# Patient Record
Sex: FEMALE | Race: WHITE | NOT HISPANIC OR LATINO | Employment: UNEMPLOYED | URBAN - METROPOLITAN AREA
[De-identification: names, ages, dates, MRNs, and addresses within clinical notes are randomized per-mention and may not be internally consistent; named-entity substitution may affect disease eponyms.]

---

## 2021-01-07 ENCOUNTER — HOSPITAL ENCOUNTER (OUTPATIENT)
Dept: RADIOLOGY | Facility: MEDICAL CENTER | Age: 56
End: 2021-01-07
Attending: INTERNAL MEDICINE
Payer: COMMERCIAL

## 2021-01-07 DIAGNOSIS — C72.9 MALIGNANT NEOPLASM OF NERVOUS SYSTEM (HCC): ICD-10-CM

## 2021-01-07 PROCEDURE — A9576 INJ PROHANCE MULTIPACK: HCPCS | Performed by: INTERNAL MEDICINE

## 2021-01-07 PROCEDURE — 70553 MRI BRAIN STEM W/O & W/DYE: CPT

## 2021-01-07 PROCEDURE — 700117 HCHG RX CONTRAST REV CODE 255: Performed by: INTERNAL MEDICINE

## 2021-01-07 RX ADMIN — GADOTERIDOL 19 ML: 279.3 INJECTION, SOLUTION INTRAVENOUS at 08:52

## 2022-04-16 ENCOUNTER — ANESTHESIA (OUTPATIENT)
Dept: SURGERY | Facility: MEDICAL CENTER | Age: 57
End: 2022-04-16
Payer: COMMERCIAL

## 2022-04-16 ENCOUNTER — ANESTHESIA EVENT (OUTPATIENT)
Dept: SURGERY | Facility: MEDICAL CENTER | Age: 57
End: 2022-04-16
Payer: COMMERCIAL

## 2022-04-16 ENCOUNTER — HOSPITAL ENCOUNTER (OUTPATIENT)
Facility: MEDICAL CENTER | Age: 57
End: 2022-04-17
Attending: EMERGENCY MEDICINE | Admitting: SURGERY
Payer: COMMERCIAL

## 2022-04-16 ENCOUNTER — APPOINTMENT (OUTPATIENT)
Dept: RADIOLOGY | Facility: MEDICAL CENTER | Age: 57
End: 2022-04-16
Attending: EMERGENCY MEDICINE
Payer: COMMERCIAL

## 2022-04-16 DIAGNOSIS — K82.9 GALLBLADDER DISEASE: ICD-10-CM

## 2022-04-16 PROBLEM — K80.00 ACUTE CALCULOUS CHOLECYSTITIS: Status: ACTIVE | Noted: 2022-04-16

## 2022-04-16 PROBLEM — M31.30 WEGENER'S GRANULOMATOSIS WITHOUT RENAL INVOLVEMENT (HCC): Status: ACTIVE | Noted: 2022-04-16

## 2022-04-16 LAB
ABO + RH BLD: NORMAL
ABO GROUP BLD: NORMAL
ALBUMIN SERPL BCP-MCNC: 4.3 G/DL (ref 3.2–4.9)
ALBUMIN/GLOB SERPL: 1.5 G/DL
ALP SERPL-CCNC: 112 U/L (ref 30–99)
ALT SERPL-CCNC: 24 U/L (ref 2–50)
ANION GAP SERPL CALC-SCNC: 13 MMOL/L (ref 7–16)
APPEARANCE UR: CLEAR
AST SERPL-CCNC: 16 U/L (ref 12–45)
BASOPHILS # BLD AUTO: 0.3 % (ref 0–1.8)
BASOPHILS # BLD: 0.05 K/UL (ref 0–0.12)
BILIRUB SERPL-MCNC: 0.4 MG/DL (ref 0.1–1.5)
BILIRUB UR QL STRIP.AUTO: NEGATIVE
BLD GP AB SCN SERPL QL: NORMAL
BUN SERPL-MCNC: 17 MG/DL (ref 8–22)
CALCIUM SERPL-MCNC: 9.6 MG/DL (ref 8.5–10.5)
CFT BLD TEG: 5.2 MIN (ref 4.6–9.1)
CFT P HPASE BLD TEG: 5.2 MIN (ref 4.3–8.3)
CHLORIDE SERPL-SCNC: 105 MMOL/L (ref 96–112)
CLOT ANGLE BLD TEG: 74.9 DEGREES (ref 63–78)
CO2 SERPL-SCNC: 23 MMOL/L (ref 20–33)
COLOR UR: YELLOW
CREAT SERPL-MCNC: 0.57 MG/DL (ref 0.5–1.4)
CT.EXTRINSIC BLD ROTEM: 1.1 MIN (ref 0.8–2.1)
EKG IMPRESSION: NORMAL
EOSINOPHIL # BLD AUTO: 0.26 K/UL (ref 0–0.51)
EOSINOPHIL NFR BLD: 1.8 % (ref 0–6.9)
ERYTHROCYTE [DISTWIDTH] IN BLOOD BY AUTOMATED COUNT: 49.1 FL (ref 35.9–50)
GFR SERPLBLD CREATININE-BSD FMLA CKD-EPI: 105 ML/MIN/1.73 M 2
GLOBULIN SER CALC-MCNC: 2.9 G/DL (ref 1.9–3.5)
GLUCOSE SERPL-MCNC: 111 MG/DL (ref 65–99)
GLUCOSE UR STRIP.AUTO-MCNC: NEGATIVE MG/DL
HCT VFR BLD AUTO: 46.9 % (ref 37–47)
HGB BLD-MCNC: 15.7 G/DL (ref 12–16)
IMM GRANULOCYTES # BLD AUTO: 0.07 K/UL (ref 0–0.11)
IMM GRANULOCYTES NFR BLD AUTO: 0.5 % (ref 0–0.9)
KETONES UR STRIP.AUTO-MCNC: NEGATIVE MG/DL
LEUKOCYTE ESTERASE UR QL STRIP.AUTO: NEGATIVE
LIPASE SERPL-CCNC: 21 U/L (ref 11–82)
LYMPHOCYTES # BLD AUTO: 3.02 K/UL (ref 1–4.8)
LYMPHOCYTES NFR BLD: 20.9 % (ref 22–41)
MCF BLD TEG: 64.1 MM (ref 52–69)
MCF.PLATELET INHIB BLD ROTEM: 26.6 MM (ref 15–32)
MCH RBC QN AUTO: 32 PG (ref 27–33)
MCHC RBC AUTO-ENTMCNC: 33.5 G/DL (ref 33.6–35)
MCV RBC AUTO: 95.5 FL (ref 81.4–97.8)
MICRO URNS: NORMAL
MONOCYTES # BLD AUTO: 1.02 K/UL (ref 0–0.85)
MONOCYTES NFR BLD AUTO: 7.1 % (ref 0–13.4)
NEUTROPHILS # BLD AUTO: 10.04 K/UL (ref 2–7.15)
NEUTROPHILS NFR BLD: 69.4 % (ref 44–72)
NITRITE UR QL STRIP.AUTO: NEGATIVE
NRBC # BLD AUTO: 0 K/UL
NRBC BLD-RTO: 0 /100 WBC
PA AA BLD-ACNC: 42.7 % (ref 0–11)
PA ADP BLD-ACNC: 13.3 % (ref 0–17)
PH UR STRIP.AUTO: 5.5 [PH] (ref 5–8)
PLATELET # BLD AUTO: 294 K/UL (ref 164–446)
PMV BLD AUTO: 9.2 FL (ref 9–12.9)
POTASSIUM SERPL-SCNC: 4.5 MMOL/L (ref 3.6–5.5)
PROT SERPL-MCNC: 7.2 G/DL (ref 6–8.2)
PROT UR QL STRIP: NEGATIVE MG/DL
RBC # BLD AUTO: 4.91 M/UL (ref 4.2–5.4)
RBC UR QL AUTO: NEGATIVE
RH BLD: NORMAL
SARS-COV+SARS-COV-2 AG RESP QL IA.RAPID: NOTDETECTED
SODIUM SERPL-SCNC: 141 MMOL/L (ref 135–145)
SP GR UR STRIP.AUTO: 1.02
SPECIMEN SOURCE: NORMAL
TEG ALGORITHM TGALG: ABNORMAL
UROBILINOGEN UR STRIP.AUTO-MCNC: 0.2 MG/DL
WBC # BLD AUTO: 14.5 K/UL (ref 4.8–10.8)

## 2022-04-16 PROCEDURE — 00790 ANES IPER UPR ABD NOS: CPT | Performed by: ANESTHESIOLOGY

## 2022-04-16 PROCEDURE — 86900 BLOOD TYPING SEROLOGIC ABO: CPT

## 2022-04-16 PROCEDURE — 160048 HCHG OR STATISTICAL LEVEL 1-5: Performed by: SURGERY

## 2022-04-16 PROCEDURE — 85025 COMPLETE CBC W/AUTO DIFF WBC: CPT

## 2022-04-16 PROCEDURE — 700111 HCHG RX REV CODE 636 W/ 250 OVERRIDE (IP): Performed by: EMERGENCY MEDICINE

## 2022-04-16 PROCEDURE — 700102 HCHG RX REV CODE 250 W/ 637 OVERRIDE(OP): Performed by: SURGERY

## 2022-04-16 PROCEDURE — 500002 HCHG ADHESIVE, DERMABOND: Performed by: SURGERY

## 2022-04-16 PROCEDURE — 80053 COMPREHEN METABOLIC PANEL: CPT

## 2022-04-16 PROCEDURE — 85576 BLOOD PLATELET AGGREGATION: CPT | Mod: 91

## 2022-04-16 PROCEDURE — 96375 TX/PRO/DX INJ NEW DRUG ADDON: CPT

## 2022-04-16 PROCEDURE — 47562 LAPAROSCOPIC CHOLECYSTECTOMY: CPT | Performed by: SURGERY

## 2022-04-16 PROCEDURE — 160036 HCHG PACU - EA ADDL 30 MINS PHASE I: Performed by: SURGERY

## 2022-04-16 PROCEDURE — 700101 HCHG RX REV CODE 250: Performed by: SURGERY

## 2022-04-16 PROCEDURE — 81003 URINALYSIS AUTO W/O SCOPE: CPT

## 2022-04-16 PROCEDURE — 36415 COLL VENOUS BLD VENIPUNCTURE: CPT

## 2022-04-16 PROCEDURE — 501838 HCHG SUTURE GENERAL: Performed by: SURGERY

## 2022-04-16 PROCEDURE — 502571 HCHG PACK, LAP CHOLE: Performed by: SURGERY

## 2022-04-16 PROCEDURE — 86850 RBC ANTIBODY SCREEN: CPT

## 2022-04-16 PROCEDURE — 700105 HCHG RX REV CODE 258: Performed by: ANESTHESIOLOGY

## 2022-04-16 PROCEDURE — G0378 HOSPITAL OBSERVATION PER HR: HCPCS

## 2022-04-16 PROCEDURE — 96374 THER/PROPH/DIAG INJ IV PUSH: CPT

## 2022-04-16 PROCEDURE — 160041 HCHG SURGERY MINUTES - EA ADDL 1 MIN LEVEL 4: Performed by: SURGERY

## 2022-04-16 PROCEDURE — 99222 1ST HOSP IP/OBS MODERATE 55: CPT | Mod: 57 | Performed by: SURGERY

## 2022-04-16 PROCEDURE — 76705 ECHO EXAM OF ABDOMEN: CPT

## 2022-04-16 PROCEDURE — 83690 ASSAY OF LIPASE: CPT

## 2022-04-16 PROCEDURE — 500868 HCHG NEEDLE, SURGI(VARES): Performed by: SURGERY

## 2022-04-16 PROCEDURE — 160002 HCHG RECOVERY MINUTES (STAT): Performed by: SURGERY

## 2022-04-16 PROCEDURE — A9270 NON-COVERED ITEM OR SERVICE: HCPCS | Performed by: SURGERY

## 2022-04-16 PROCEDURE — 160029 HCHG SURGERY MINUTES - 1ST 30 MINS LEVEL 4: Performed by: SURGERY

## 2022-04-16 PROCEDURE — 700111 HCHG RX REV CODE 636 W/ 250 OVERRIDE (IP): Performed by: ANESTHESIOLOGY

## 2022-04-16 PROCEDURE — 86901 BLOOD TYPING SEROLOGIC RH(D): CPT

## 2022-04-16 PROCEDURE — A9270 NON-COVERED ITEM OR SERVICE: HCPCS | Performed by: ANESTHESIOLOGY

## 2022-04-16 PROCEDURE — 88304 TISSUE EXAM BY PATHOLOGIST: CPT

## 2022-04-16 PROCEDURE — 501399 HCHG SPECIMAN BAG, ENDO CATC: Performed by: SURGERY

## 2022-04-16 PROCEDURE — 99285 EMERGENCY DEPT VISIT HI MDM: CPT

## 2022-04-16 PROCEDURE — 700101 HCHG RX REV CODE 250: Performed by: ANESTHESIOLOGY

## 2022-04-16 PROCEDURE — 501570 HCHG TROCAR, SEPARATOR: Performed by: SURGERY

## 2022-04-16 PROCEDURE — 700105 HCHG RX REV CODE 258: Performed by: SURGERY

## 2022-04-16 PROCEDURE — 99140 ANES COMP EMERGENCY COND: CPT | Performed by: ANESTHESIOLOGY

## 2022-04-16 PROCEDURE — 160009 HCHG ANES TIME/MIN: Performed by: SURGERY

## 2022-04-16 PROCEDURE — 700102 HCHG RX REV CODE 250 W/ 637 OVERRIDE(OP): Performed by: ANESTHESIOLOGY

## 2022-04-16 PROCEDURE — 71045 X-RAY EXAM CHEST 1 VIEW: CPT

## 2022-04-16 PROCEDURE — 93005 ELECTROCARDIOGRAM TRACING: CPT | Performed by: EMERGENCY MEDICINE

## 2022-04-16 PROCEDURE — 85347 COAGULATION TIME ACTIVATED: CPT

## 2022-04-16 PROCEDURE — 87426 SARSCOV CORONAVIRUS AG IA: CPT

## 2022-04-16 PROCEDURE — 501571 HCHG TROCAR, SEPARATOR 12X100: Performed by: SURGERY

## 2022-04-16 PROCEDURE — 47562 LAPAROSCOPIC CHOLECYSTECTOMY: CPT | Mod: AS | Performed by: NURSE PRACTITIONER

## 2022-04-16 PROCEDURE — 160035 HCHG PACU - 1ST 60 MINS PHASE I: Performed by: SURGERY

## 2022-04-16 PROCEDURE — 85384 FIBRINOGEN ACTIVITY: CPT

## 2022-04-16 RX ORDER — ZOLPIDEM TARTRATE 10 MG/1
10 TABLET ORAL
COMMUNITY

## 2022-04-16 RX ORDER — ROCURONIUM BROMIDE 10 MG/ML
INJECTION, SOLUTION INTRAVENOUS PRN
Status: DISCONTINUED | OUTPATIENT
Start: 2022-04-16 | End: 2022-04-16 | Stop reason: SURG

## 2022-04-16 RX ORDER — HYDROMORPHONE HYDROCHLORIDE 1 MG/ML
0.6 INJECTION, SOLUTION INTRAMUSCULAR; INTRAVENOUS; SUBCUTANEOUS
Status: DISCONTINUED | OUTPATIENT
Start: 2022-04-16 | End: 2022-04-16 | Stop reason: HOSPADM

## 2022-04-16 RX ORDER — BISACODYL 10 MG
10 SUPPOSITORY, RECTAL RECTAL
Status: DISCONTINUED | OUTPATIENT
Start: 2022-04-16 | End: 2022-04-17 | Stop reason: HOSPADM

## 2022-04-16 RX ORDER — ONDANSETRON 2 MG/ML
4 INJECTION INTRAMUSCULAR; INTRAVENOUS
Status: COMPLETED | OUTPATIENT
Start: 2022-04-16 | End: 2022-04-16

## 2022-04-16 RX ORDER — OXYCODONE HYDROCHLORIDE 10 MG/1
10 TABLET ORAL
Status: DISCONTINUED | OUTPATIENT
Start: 2022-04-16 | End: 2022-04-17 | Stop reason: HOSPADM

## 2022-04-16 RX ORDER — ONDANSETRON 2 MG/ML
4 INJECTION INTRAMUSCULAR; INTRAVENOUS EVERY 4 HOURS PRN
Status: DISCONTINUED | OUTPATIENT
Start: 2022-04-16 | End: 2022-04-17 | Stop reason: HOSPADM

## 2022-04-16 RX ORDER — OXYCODONE HYDROCHLORIDE 5 MG/1
5 TABLET ORAL
Status: DISCONTINUED | OUTPATIENT
Start: 2022-04-16 | End: 2022-04-17 | Stop reason: HOSPADM

## 2022-04-16 RX ORDER — HYDROMORPHONE HYDROCHLORIDE 1 MG/ML
0.5 INJECTION, SOLUTION INTRAMUSCULAR; INTRAVENOUS; SUBCUTANEOUS
Status: DISCONTINUED | OUTPATIENT
Start: 2022-04-16 | End: 2022-04-17 | Stop reason: HOSPADM

## 2022-04-16 RX ORDER — DOCUSATE SODIUM 100 MG/1
100 CAPSULE, LIQUID FILLED ORAL 2 TIMES DAILY
Status: DISCONTINUED | OUTPATIENT
Start: 2022-04-16 | End: 2022-04-17 | Stop reason: HOSPADM

## 2022-04-16 RX ORDER — AZITHROMYCIN 250 MG/1
250-500 TABLET, FILM COATED ORAL DAILY
Status: SHIPPED | COMMUNITY
End: 2023-05-22

## 2022-04-16 RX ORDER — SIMVASTATIN 20 MG
20 TABLET ORAL NIGHTLY
Status: DISCONTINUED | OUTPATIENT
Start: 2022-04-16 | End: 2022-04-17 | Stop reason: HOSPADM

## 2022-04-16 RX ORDER — KETOROLAC TROMETHAMINE 30 MG/ML
INJECTION, SOLUTION INTRAMUSCULAR; INTRAVENOUS PRN
Status: DISCONTINUED | OUTPATIENT
Start: 2022-04-16 | End: 2022-04-16 | Stop reason: SURG

## 2022-04-16 RX ORDER — AMOXICILLIN 250 MG
1 CAPSULE ORAL NIGHTLY
Status: DISCONTINUED | OUTPATIENT
Start: 2022-04-16 | End: 2022-04-17 | Stop reason: HOSPADM

## 2022-04-16 RX ORDER — HYDRALAZINE HYDROCHLORIDE 20 MG/ML
5 INJECTION INTRAMUSCULAR; INTRAVENOUS
Status: DISCONTINUED | OUTPATIENT
Start: 2022-04-16 | End: 2022-04-16 | Stop reason: HOSPADM

## 2022-04-16 RX ORDER — DIPHENHYDRAMINE HYDROCHLORIDE 50 MG/ML
12.5 INJECTION INTRAMUSCULAR; INTRAVENOUS
Status: DISCONTINUED | OUTPATIENT
Start: 2022-04-16 | End: 2022-04-16 | Stop reason: HOSPADM

## 2022-04-16 RX ORDER — BUPIVACAINE HYDROCHLORIDE AND EPINEPHRINE 5; 5 MG/ML; UG/ML
INJECTION, SOLUTION EPIDURAL; INTRACAUDAL; PERINEURAL
Status: DISCONTINUED | OUTPATIENT
Start: 2022-04-16 | End: 2022-04-16 | Stop reason: HOSPADM

## 2022-04-16 RX ORDER — ENEMA 19; 7 G/133ML; G/133ML
1 ENEMA RECTAL
Status: DISCONTINUED | OUTPATIENT
Start: 2022-04-16 | End: 2022-04-17 | Stop reason: HOSPADM

## 2022-04-16 RX ORDER — OXYCODONE HCL 5 MG/5 ML
10 SOLUTION, ORAL ORAL
Status: COMPLETED | OUTPATIENT
Start: 2022-04-16 | End: 2022-04-16

## 2022-04-16 RX ORDER — CEFOTETAN DISODIUM 2 G/20ML
INJECTION, POWDER, FOR SOLUTION INTRAMUSCULAR; INTRAVENOUS PRN
Status: DISCONTINUED | OUTPATIENT
Start: 2022-04-16 | End: 2022-04-16 | Stop reason: SURG

## 2022-04-16 RX ORDER — SODIUM CHLORIDE, SODIUM LACTATE, POTASSIUM CHLORIDE, CALCIUM CHLORIDE 600; 310; 30; 20 MG/100ML; MG/100ML; MG/100ML; MG/100ML
INJECTION, SOLUTION INTRAVENOUS CONTINUOUS
Status: DISCONTINUED | OUTPATIENT
Start: 2022-04-16 | End: 2022-04-16 | Stop reason: HOSPADM

## 2022-04-16 RX ORDER — SIMVASTATIN 20 MG
20 TABLET ORAL NIGHTLY
Status: SHIPPED | COMMUNITY
End: 2023-12-26

## 2022-04-16 RX ORDER — HYDROMORPHONE HYDROCHLORIDE 1 MG/ML
0.4 INJECTION, SOLUTION INTRAMUSCULAR; INTRAVENOUS; SUBCUTANEOUS
Status: DISCONTINUED | OUTPATIENT
Start: 2022-04-16 | End: 2022-04-16 | Stop reason: HOSPADM

## 2022-04-16 RX ORDER — HYDROMORPHONE HYDROCHLORIDE 1 MG/ML
0.2 INJECTION, SOLUTION INTRAMUSCULAR; INTRAVENOUS; SUBCUTANEOUS
Status: DISCONTINUED | OUTPATIENT
Start: 2022-04-16 | End: 2022-04-16 | Stop reason: HOSPADM

## 2022-04-16 RX ORDER — MEPERIDINE HYDROCHLORIDE 25 MG/ML
12.5 INJECTION INTRAMUSCULAR; INTRAVENOUS; SUBCUTANEOUS
Status: DISCONTINUED | OUTPATIENT
Start: 2022-04-16 | End: 2022-04-16 | Stop reason: HOSPADM

## 2022-04-16 RX ORDER — SODIUM CHLORIDE, SODIUM LACTATE, POTASSIUM CHLORIDE, CALCIUM CHLORIDE 600; 310; 30; 20 MG/100ML; MG/100ML; MG/100ML; MG/100ML
INJECTION, SOLUTION INTRAVENOUS
Status: DISCONTINUED | OUTPATIENT
Start: 2022-04-16 | End: 2022-04-16 | Stop reason: SURG

## 2022-04-16 RX ORDER — ACETAMINOPHEN 325 MG/1
650 TABLET ORAL EVERY 4 HOURS PRN
Status: DISCONTINUED | OUTPATIENT
Start: 2022-04-16 | End: 2022-04-17 | Stop reason: HOSPADM

## 2022-04-16 RX ORDER — PROMETHAZINE HYDROCHLORIDE 25 MG/1
25 TABLET ORAL
COMMUNITY

## 2022-04-16 RX ORDER — DEXAMETHASONE SODIUM PHOSPHATE 4 MG/ML
INJECTION, SOLUTION INTRA-ARTICULAR; INTRALESIONAL; INTRAMUSCULAR; INTRAVENOUS; SOFT TISSUE PRN
Status: DISCONTINUED | OUTPATIENT
Start: 2022-04-16 | End: 2022-04-16 | Stop reason: SURG

## 2022-04-16 RX ORDER — FOLIC ACID 1 MG/1
1 TABLET ORAL DAILY
Status: DISCONTINUED | OUTPATIENT
Start: 2022-04-16 | End: 2022-04-17 | Stop reason: HOSPADM

## 2022-04-16 RX ORDER — MIDAZOLAM HYDROCHLORIDE 1 MG/ML
INJECTION INTRAMUSCULAR; INTRAVENOUS PRN
Status: DISCONTINUED | OUTPATIENT
Start: 2022-04-16 | End: 2022-04-16 | Stop reason: SURG

## 2022-04-16 RX ORDER — FOLIC ACID 1 MG/1
1 TABLET ORAL DAILY
Status: SHIPPED | COMMUNITY
End: 2023-09-25 | Stop reason: SDUPTHER

## 2022-04-16 RX ORDER — SODIUM CHLORIDE, SODIUM LACTATE, POTASSIUM CHLORIDE, CALCIUM CHLORIDE 600; 310; 30; 20 MG/100ML; MG/100ML; MG/100ML; MG/100ML
INJECTION, SOLUTION INTRAVENOUS CONTINUOUS
Status: DISCONTINUED | OUTPATIENT
Start: 2022-04-16 | End: 2022-04-17 | Stop reason: HOSPADM

## 2022-04-16 RX ORDER — KETOROLAC TROMETHAMINE 30 MG/ML
15 INJECTION, SOLUTION INTRAMUSCULAR; INTRAVENOUS ONCE
Status: COMPLETED | OUTPATIENT
Start: 2022-04-16 | End: 2022-04-16

## 2022-04-16 RX ORDER — ONDANSETRON 2 MG/ML
INJECTION INTRAMUSCULAR; INTRAVENOUS PRN
Status: DISCONTINUED | OUTPATIENT
Start: 2022-04-16 | End: 2022-04-16 | Stop reason: SURG

## 2022-04-16 RX ORDER — POLYETHYLENE GLYCOL 3350 17 G/17G
1 POWDER, FOR SOLUTION ORAL 2 TIMES DAILY
Status: DISCONTINUED | OUTPATIENT
Start: 2022-04-16 | End: 2022-04-17 | Stop reason: HOSPADM

## 2022-04-16 RX ORDER — LIDOCAINE HYDROCHLORIDE 40 MG/ML
SOLUTION TOPICAL PRN
Status: DISCONTINUED | OUTPATIENT
Start: 2022-04-16 | End: 2022-04-16 | Stop reason: SURG

## 2022-04-16 RX ORDER — OXYCODONE HCL 5 MG/5 ML
5 SOLUTION, ORAL ORAL
Status: COMPLETED | OUTPATIENT
Start: 2022-04-16 | End: 2022-04-16

## 2022-04-16 RX ORDER — ONDANSETRON 4 MG/1
4 TABLET, ORALLY DISINTEGRATING ORAL EVERY 4 HOURS PRN
Status: DISCONTINUED | OUTPATIENT
Start: 2022-04-16 | End: 2022-04-17 | Stop reason: HOSPADM

## 2022-04-16 RX ORDER — CEFTRIAXONE 2 G/1
2 INJECTION, POWDER, FOR SOLUTION INTRAMUSCULAR; INTRAVENOUS ONCE
Status: COMPLETED | OUTPATIENT
Start: 2022-04-16 | End: 2022-04-16

## 2022-04-16 RX ORDER — AMOXICILLIN 250 MG
1 CAPSULE ORAL
Status: DISCONTINUED | OUTPATIENT
Start: 2022-04-16 | End: 2022-04-17 | Stop reason: HOSPADM

## 2022-04-16 RX ORDER — IBUPROFEN 200 MG
800 TABLET ORAL
Status: SHIPPED | COMMUNITY
End: 2023-12-26

## 2022-04-16 RX ORDER — HALOPERIDOL 5 MG/ML
1 INJECTION INTRAMUSCULAR
Status: DISCONTINUED | OUTPATIENT
Start: 2022-04-16 | End: 2022-04-16 | Stop reason: HOSPADM

## 2022-04-16 RX ADMIN — OXYCODONE HYDROCHLORIDE 10 MG: 5 SOLUTION ORAL at 17:28

## 2022-04-16 RX ADMIN — LIDOCAINE HYDROCHLORIDE 4 ML: 40 SOLUTION TOPICAL at 16:01

## 2022-04-16 RX ADMIN — FENTANYL CITRATE 50 MCG: 50 INJECTION, SOLUTION INTRAMUSCULAR; INTRAVENOUS at 16:17

## 2022-04-16 RX ADMIN — KETOROLAC TROMETHAMINE 15 MG: 30 INJECTION, SOLUTION INTRAMUSCULAR at 08:57

## 2022-04-16 RX ADMIN — ROCURONIUM BROMIDE 50 MG: 10 INJECTION, SOLUTION INTRAVENOUS at 15:51

## 2022-04-16 RX ADMIN — FENTANYL CITRATE 25 MCG: 50 INJECTION, SOLUTION INTRAMUSCULAR; INTRAVENOUS at 17:31

## 2022-04-16 RX ADMIN — KETOROLAC TROMETHAMINE 30 MG: 30 INJECTION, SOLUTION INTRAMUSCULAR at 16:41

## 2022-04-16 RX ADMIN — SODIUM CHLORIDE, POTASSIUM CHLORIDE, SODIUM LACTATE AND CALCIUM CHLORIDE: 600; 310; 30; 20 INJECTION, SOLUTION INTRAVENOUS at 12:42

## 2022-04-16 RX ADMIN — FENTANYL CITRATE 100 MCG: 50 INJECTION, SOLUTION INTRAMUSCULAR; INTRAVENOUS at 15:49

## 2022-04-16 RX ADMIN — SUGAMMADEX 200 MG: 100 INJECTION, SOLUTION INTRAVENOUS at 16:44

## 2022-04-16 RX ADMIN — ONDANSETRON 4 MG: 2 INJECTION INTRAMUSCULAR; INTRAVENOUS at 17:13

## 2022-04-16 RX ADMIN — CEFTRIAXONE SODIUM 2 G: 2 INJECTION, POWDER, FOR SOLUTION INTRAMUSCULAR; INTRAVENOUS at 09:59

## 2022-04-16 RX ADMIN — MIDAZOLAM HYDROCHLORIDE 2 MG: 1 INJECTION, SOLUTION INTRAMUSCULAR; INTRAVENOUS at 15:50

## 2022-04-16 RX ADMIN — HALOPERIDOL LACTATE 1 MG: 5 INJECTION, SOLUTION INTRAMUSCULAR at 17:38

## 2022-04-16 RX ADMIN — SIMVASTATIN 20 MG: 20 TABLET, FILM COATED ORAL at 21:01

## 2022-04-16 RX ADMIN — FENTANYL CITRATE 50 MCG: 50 INJECTION, SOLUTION INTRAMUSCULAR; INTRAVENOUS at 17:20

## 2022-04-16 RX ADMIN — ONDANSETRON 4 MG: 2 INJECTION INTRAMUSCULAR; INTRAVENOUS at 16:41

## 2022-04-16 RX ADMIN — FENTANYL CITRATE 25 MCG: 50 INJECTION, SOLUTION INTRAMUSCULAR; INTRAVENOUS at 17:37

## 2022-04-16 RX ADMIN — FENTANYL CITRATE 50 MCG: 50 INJECTION, SOLUTION INTRAMUSCULAR; INTRAVENOUS at 16:41

## 2022-04-16 RX ADMIN — DEXAMETHASONE SODIUM PHOSPHATE 8 MG: 4 INJECTION, SOLUTION INTRA-ARTICULAR; INTRALESIONAL; INTRAMUSCULAR; INTRAVENOUS; SOFT TISSUE at 16:41

## 2022-04-16 RX ADMIN — PROPOFOL 200 MG: 10 INJECTION, EMULSION INTRAVENOUS at 15:51

## 2022-04-16 RX ADMIN — CEFOTETAN DISODIUM 2 G: 2 INJECTION, POWDER, FOR SOLUTION INTRAMUSCULAR; INTRAVENOUS at 16:00

## 2022-04-16 RX ADMIN — OXYCODONE 5 MG: 5 TABLET ORAL at 19:57

## 2022-04-16 RX ADMIN — SODIUM CHLORIDE, POTASSIUM CHLORIDE, SODIUM LACTATE AND CALCIUM CHLORIDE: 600; 310; 30; 20 INJECTION, SOLUTION INTRAVENOUS at 15:49

## 2022-04-16 ASSESSMENT — LIFESTYLE VARIABLES
TOTAL SCORE: 0
CONSUMPTION TOTAL: NEGATIVE
ON A TYPICAL DAY WHEN YOU DRINK ALCOHOL HOW MANY DRINKS DO YOU HAVE: 0
TOTAL SCORE: 0
EVER HAD A DRINK FIRST THING IN THE MORNING TO STEADY YOUR NERVES TO GET RID OF A HANGOVER: NO
EVER FELT BAD OR GUILTY ABOUT YOUR DRINKING: NO
HAVE YOU EVER FELT YOU SHOULD CUT DOWN ON YOUR DRINKING: NO
TOTAL SCORE: 0
HOW MANY TIMES IN THE PAST YEAR HAVE YOU HAD 5 OR MORE DRINKS IN A DAY: 0
ALCOHOL_USE: NO
HAVE PEOPLE ANNOYED YOU BY CRITICIZING YOUR DRINKING: NO
AVERAGE NUMBER OF DAYS PER WEEK YOU HAVE A DRINK CONTAINING ALCOHOL: 0

## 2022-04-16 ASSESSMENT — PAIN DESCRIPTION - PAIN TYPE
TYPE: ACUTE PAIN
TYPE: SURGICAL PAIN
TYPE: ACUTE PAIN
TYPE: SURGICAL PAIN

## 2022-04-16 ASSESSMENT — PATIENT HEALTH QUESTIONNAIRE - PHQ9
2. FEELING DOWN, DEPRESSED, IRRITABLE, OR HOPELESS: NOT AT ALL
1. LITTLE INTEREST OR PLEASURE IN DOING THINGS: NOT AT ALL
SUM OF ALL RESPONSES TO PHQ9 QUESTIONS 1 AND 2: 0

## 2022-04-16 ASSESSMENT — PAIN SCALES - GENERAL: PAIN_LEVEL: 1

## 2022-04-16 ASSESSMENT — FIBROSIS 4 INDEX: FIB4 SCORE: 0.63

## 2022-04-16 NOTE — ED TRIAGE NOTES
Celine Li  56 y.o. adult  Chief Complaint   Patient presents with   • Abdominal Pain     8/10 RUQ pain, worse after eating. Last week MD diagnosed with large gall stone and recommended patient have gallstone removed       Pt ambulatory to triage with steady gait for above complaint.     Pt is alert and oriented, speaking in full sentences, follows commands and responds appropriately to questions. Resp are even and unlabored.     Protocol ordered. Urine cup provided. Patient placed in [facility] for phlebotomy. Pt educated on triage process. Pt encouraged to alert staff for any changes. This RN masked and in appropriate PPE during encounter.     Vitals:    04/16/22 0751   BP: 120/78   Pulse: 78   Resp: 18   Temp: 36.4 °C (97.6 °F)   SpO2: 96%

## 2022-04-16 NOTE — PROGRESS NOTES
Assessment completed. Pt A&Ox4. Respirations are even and unlabored on RA. Pt reports 6/10 RUQ pain at this time, tolerable. Monitors applied, VS stable, call light and belongings within reach. POC updated (surgery). Pt educated on room and call light, pt verbalized understanding. Communication board updated. Needs met. NPO for procedure.  at bedside.

## 2022-04-16 NOTE — ED PROVIDER NOTES
ED Provider Note    CHIEF COMPLAINT  Chief Complaint   Patient presents with   • Abdominal Pain     8/10 RUQ pain, worse after eating. Last week MD diagnosed with large gall stone and recommended patient have gallstone removed       \[facility]\""  Celine Li is a 56 y.o. adult who presents with a past medical history significant for Wegener's granulomatosis, previously on methotrexate, the patient is scheduled to start Rituxan in the future but was told to have her gallbladder evaluated before starting this medication, she said she had a CT scan done in January which showed gallstones, she presents with severe right upper quadrant and epigastric pain.  It is worse after eating.  She has had nausea as well.  She has had no diarrhea, no lower abdominal pain.  She says she is had an appendectomy previously.  She describes the pain as sharp and severe and intermittent.  Currently the pain is severe.    REVIEW OF SYSTEMS  See \[facility]\"" for further details. All other systems are negative.     PAST MEDICAL HISTORY    Wegener's granulomatosis    SOCIAL HISTORY  Social History     Tobacco Use   • Smoking status: Current Every Day Smoker     Packs/day: 0.50     Years: 30.00     Pack years: 15.00     Types: Cigarettes   • Smokeless tobacco: Never Used   Vaping Use   • Vaping Use: Never used   Substance and Sexual Activity   • Alcohol use: Yes     Comment: occ   • Drug use: Never   • Sexual activity: Not on file       SURGICAL HISTORY  patient denies any surgical history    CURRENT MEDICATIONS  Home Medications     Reviewed by Ilene Rock (Pharmacy Intern) on 04/16/22 at 0953  Med List Status: Complete   Medication Last Dose Status   azithromycin (ZITHROMAX) 250 MG Tab >week Active   folic acid (FOLVITE) 1 MG Tab 4/16/2022 Active   ibuprofen (MOTRIN) 200 MG Tab 4/15/2022 Active   methotrexate 2.5 MG Tab 4/10/2022 Active   promethazine (PHENERGAN) 25 MG Tab 4/10/2022 Active   simvastatin (ZOCOR) 20 MG Tab 4/15/2022 Active  "  zolpidem (AMBIEN) 10 MG Tab 4/15/2022 Active                ALLERGIES  Allergies   Allergen Reactions   • Penicillins Vomiting     N/V, fever   Reaction ~15 years ago       FAMILY HISTORY  No pertinent family history    PHYSICAL EXAM  VITAL SIGNS: /62   Pulse 67   Temp 36.4 °C (97.6 °F) (Temporal)   Resp 16   Ht 1.702 m (5' 7\")   Wt 87.9 kg (193 lb 12.6 oz)   SpO2 92%   BMI 30.35 kg/m²  @JAQUI[[id number]::@   Pulse ox interpretation: I interpret this pulse ox as normal.  Constitutional: Alert in no apparent distress.  HENT: No signs of trauma, Bilateral external ears normal, Nose normal.   Eyes: Pupils are equal and reactive, Conjunctiva normal, Non-icteric.   Neck: Normal range of motion, No tenderness, Supple, No stridor.   Lymphatic: No lymphadenopathy noted.   Cardiovascular: Regular rate and rhythm, no murmurs.   Thorax & Lungs: Normal breath sounds, No respiratory distress, No wheezing, No chest tenderness.   Abdomen: Bowel sounds normal, Soft, right upper quadrant tenderness.  No peritoneal signs.  Skin: Warm, Dry, No erythema, No rash.   Extremities: Intact distal pulses, No edema, No tenderness, No cyanosis.  Musculoskeletal: Good range of motion in all major joints. No tenderness to palpation or major deformities noted.   Neurologic: Alert , Normal motor function, Normal sensory function, No focal deficits noted.   Psychiatric: Affect normal, Judgment normal, Mood normal.       DIAGNOSTIC STUDIES / PROCEDURES        LABS  Labs Reviewed   CBC WITH DIFFERENTIAL - Abnormal; Notable for the following components:       Result Value    WBC 14.5 (*)     MCHC 33.5 (*)     Lymphocytes 20.90 (*)     Neutrophils (Absolute) 10.04 (*)     Monos (Absolute) 1.02 (*)     All other components within normal limits   COMP METABOLIC PANEL - Abnormal; Notable for the following components:    Glucose 111 (*)     All other components within normal limits   LIPASE   URINALYSIS   ESTIMATED GFR   SARS-COV ANTIGEN MIKAYLA   COD " (ADULT)   PLATELET MAPPING WITH BASIC TEG    Narrative:     Do you want to extend TEG graph to LY30? (If no, graph will  terminate at MA)->No   ABO RH CONFIRM         RADIOLOGY  DX-CHEST-PORTABLE (1 VIEW)   Final Result      Mild left basilar atelectasis      US-RUQ   Final Result      1.  Cholelithiasis with borderline gallbladder wall thickening. Findings could represent early acute cholecystitis in the appropriate clinical setting      2.  Increased hepatic echotexture is consistent with fatty infiltration. Hepatocellular disease can have a similar appearance in the appropriate clinical setting.      3.  No sonographic evidence for biliary obstruction.              COURSE & MEDICAL DECISION MAKING  Pertinent Labs & Imaging studies reviewed. (See chart for details)    IV was established, labs were sent, ultrasound was ordered to evaluate for gallbladder disease.  The patient was given Toradol 15 mg IV.    The patient's white blood count is elevated 14.  Her ultrasound shows possible gallbladder wall thickening.  Her ducts appear normal.  Bilirubin and liver function tests are normal.    At this point I spoke with Dr. Roblero of the general surgery service, he will assess the patient.  I ordered Rocephin IV for elevated white blood count, we do not have a previous to compare, in the setting of possible gallbladder wall thickening.    Patient is being admitted in stable condition.          FINAL IMPRESSION  1. Gallbladder disease     2.      leukocytosis           Electronically signed by: Timbo Martinez M.D., 4/16/2022 8:47 AM

## 2022-04-16 NOTE — ANESTHESIA PROCEDURE NOTES
Airway    Date/Time: 4/16/2022 3:52 PM  Performed by: Mario Gotti M.D.  Authorized by: aMrio Gotti M.D.     Location:  OR  Urgency:  Elective  Indications for Airway Management:  Anesthesia      Spontaneous Ventilation: absent    Sedation Level:  Deep  Preoxygenated: Yes    Patient Position:  Sniffing  Final Airway Type:  Endotracheal airway  Final Endotracheal Airway:  ETT  Cuffed: Yes    Technique Used for Successful ETT Placement:  Direct laryngoscopy    Insertion Site:  Oral  Blade Type:  Diana  Laryngoscope Blade/Videolaryngoscope Blade Size:  3  ETT Size (mm):  7.0  Measured from:  Teeth  ETT to Teeth (cm):  22  Placement Verified by: auscultation and capnometry    Cormack-Lehane Classification:  Grade I - full view of glottis  Number of Attempts at Approach:  1

## 2022-04-16 NOTE — ANESTHESIA PREPROCEDURE EVALUATION
Case: [id number] Date/Time: 04/16/22 1435    Procedure: CHOLECYSTECTOMY, LAPAROSCOPIC    Location: [facility] OR 11 / SURGERY [facility]    Surgeons: Demario Lal M.D.          Relevant Problems   CARDIAC   (positive) Wegener's granulomatosis without renal involvement (HCC)       Physical Exam    Airway   Mallampati: II  TM distance: >3 FB  Neck ROM: full       Cardiovascular - normal exam  Rhythm: regular  Rate: normal  (-) murmur     Dental - normal exam           Pulmonary - normal exam  Breath sounds clear to auscultation     Abdominal    Neurological - normal exam                 Anesthesia Plan    ASA 2- EMERGENT   ASA physical status emergent criteria: acute peritonitis    Plan - general       Airway plan will be ETT          Induction: intravenous    Postoperative Plan: Postoperative administration of opioids is intended.    Pertinent diagnostic labs and testing reviewed    Informed Consent:    Anesthetic plan and risks discussed with patient.    Use of blood products discussed with: patient whom consented to blood products.

## 2022-04-16 NOTE — ASSESSMENT & PLAN NOTE
Known cholelithiasis dating back to 1/2022  Persistent pain for the past week  RUQ U/S consistent with cholecystitis  LFTs normal  4/16 Lap Rosana   ACS [facility] Service   Statement Selected

## 2022-04-16 NOTE — PROGRESS NOTES
2 RN Skin Assessment Completed by Mindy RN and Cristofer RN.     Head: WDL  Ears: WDL  Nose: WDL  Mouth: WDL  Neck: WDL  Breasts/Chest: WDL  Shoulder Blades: WDL  Spine: WDL  (R) Arm/Elbow/hand: WDL  (L) Arm/Elbow/hand: WDL  Abdomen:WDL  Groin: WDL  Sacrum/Coccyx/Buttocks: blanching  (R) Leg: WDL  (L) Leg: WDL  (R) Heel/Foot/Toe: blanching  (L) Heel/Foot/Toe: blanching              Devices in place: Pulse Ox     Interventions in place:  Pillows     Possible skin injury found: No     Pictures uploaded into Epic: N/A  Wound Consult Placed: N/A

## 2022-04-16 NOTE — OP REPORT
DATE OF OPERATION: 4/16/2022    PREOPERATIVE DIAGNOSIS: Symptomatic Cholelithiasis    POSTOPERATIVE DIAGNOSIS: Same.    PROCEDURE PERFORMED: Laparoscopic cholecystectomy.     SURGEON: Kiko Lal MD    ASSISTANT: Kat CONLEY.    The indications for a surgical assistant in this surgery were indicated due to complexity of the procedure. Their role included aiding in incision, retraction, holding devices including camera for laparoscopic procedure, and closure of the wound.     ANESTHESIOLOGIST:  Mario Gotti MD..    ANESTHESIA: General endotracheal anesthesia.    FINDINGS: The gallbladder showed signs of mild chronic inflammation with a singular large gallstone.     SPECIMEN: Gallbladder with contents.    ESTIMATED BLOOD LOSS: 5 mL.    PROCEDURE: Informed consent was obtained pre-operatively.  The patient was taken back to the operating room and placed in supine position.  General endotracheal anesthesia was administered and the patient was intubated. Intravenous antibiotics were administered by the anesthesiologist in correct time interval. Sequential compression devices were placed. The abdomen was prepped and draped in a sterile fashion.  A time-out was performed and the patient and procedure were both verified.      Marcaine 0.5% with epinephrine was used to infiltrate the port sites. A 5 mm ryann-umbilical incision was made and subcutaneous tissue spread bluntly. The umbilical stalk was grasped with a Kocher and elevated toward the ceiling.  A Veress needle was atraumatically inserted into the peritoneal space. Saline test was performed and supported proper intra-peritoneal placement.  Carbon dioxide gas was applied to the port and pneumoperitoneum was achieved.  The Veress needle was removed after adequate insufflation.  A 5 mm port was placed into the intra-peritoneal space. A laparoscope was placed through this port.  The abdominal contents were inspected noted to be free of injury from Veress  needle and port placement.  A 12 mm and two 5 mm ports were then placed in the epigastric region, right subcostal, and right lateral locations under directed visualization, respectively.      The gallbladder was identified, elevated, and retracted cephalad over the liver edge by the fundus to expose the infundibulum. Dissection was carried out within the hepatocystic triangle, definitively identifying the cystic duct and cystic artery. The cystic duct could be seen clearly terminating at the infundibulum.  The critical view of safety was achieved.      The cystic duct and artery were each clipped once proximally, twice distally, and divided. The gallbladder was dissected free from the undersurface of the liver using electrocautery and placed within an EndoCatch bag. The gallbladder was delivered intact from the abdominal cavity through the epigastric port site and submitted for pathology. The gallbladder fossa was inspected and hemostasis was noted to be satisfactory.     The epigastric port site fascia was closed with a 0 Vicryl suture using a suture passer. Once tied down, the fascia was noted to be tight and well approximated.    The ports were opened and the abdomen was allowed to deflate. All ports were then removed.  The port site skin incisions were closed with interrupted 4-0 Vicryl subcuticular sutures.    The patient tolerated the procedure well and there were no apparent complications. All sponge, sharps, and instrument counts were correct on 2 separate occasions. The patient was awakened, extubated, and transferred to the PACU in satisfactory condition.               [facility] Post-Operative Data:    Emergency Case?----- Yes  Wound Classification?----- Clean Contaminated  Wound Closure?----- All Layers  ASA Classification?----- II    ____________________________________   Kiko Lal MD, FACS    JRU / NTS     DD: 4/16/2022   DT: 4:46 PM

## 2022-04-16 NOTE — H&P
Surgical History and Physical    Date of Service: 4/16/2022    Requesting Physician: Timbo Torres MD - ER    Reason for Consultation: Acute Cholecystitis    HPI: This is a 56 y.o. adult who is presenting for gallbladder surgery from [place].  She has a known history of cholelithiasis dating back to earlier this year.  Her medical history is significant for Wegener's Granulomatosis for which she takes methotrexate.  She is supposed to be starting Rituxan and her rheumatologist wanted her to have her gallbladder out before commencing this therapy.  She states she would have had her gallbladder out in [place], but the surgeon was not available.      The patient presented to [facility].  She is having persistent pain at the RUQ for the past week or so.  An ultrasound confirmed cholecystitis.    PAST MEDICAL HISTORY: Wegener's Granulomatosis, insomnia    PAST SURGICAL HISTORY: Appendectomy, Nissen Fundoplication     ALLERGIES: Penicillins       CURRENT MEDICATIONS:   Outpatient Medications Marked as Taking for the 4/16/22 encounter (Hospital Encounter)   Medication Sig   • simvastatin (ZOCOR) 20 MG Tab Take 20 mg by mouth every evening.   • zolpidem (AMBIEN) 10 MG Tab Take 10 mg by mouth at bedtime.   • folic acid (FOLVITE) 1 MG Tab Take 1 mg by mouth every day.   • methotrexate 2.5 MG Tab Take 15 mg by mouth every Sunday.   • ibuprofen (MOTRIN) 200 MG Tab Take 800 mg by mouth 1 time a day as needed for Mild Pain.   • promethazine (PHENERGAN) 25 MG Tab Take 25 mg by mouth 1 time a day as needed for Nausea/Vomiting. Pt takes with weekly methotrexate   • azithromycin (ZITHROMAX) 250 MG Tab Take 250-500 mg by mouth every day. Z Obinna: 500 mg day 1, then 250 mg days 2-5         FAMILY HISTORY:   Reviewed and found to be non-contributory in regards to the above presentation    SOCIAL HISTORY:  reports that she has been smoking cigarettes. She has a 15.00 pack-year smoking history. She has never used smokeless  "tobacco. She reports current alcohol use. She reports that she does not use drugs.      Review of Systems:  Constitutional: Negative for fever, chills, weight loss, malaise/fatigue and diaphoresis.   HENT: Negative for hearing loss, ear pain, nosebleeds, congestion, sore throat, neck pain, tinnitus and ear discharge.    Eyes: Negative for blurred vision, double vision, photophobia, pain, discharge and redness.   Respiratory: Negative for cough, hemoptysis, sputum production, shortness of breath, wheezing and stridor.    Cardiovascular: Negative for chest pain, palpitations, orthopnea, claudication, leg swelling and PND.   Gastrointestinal: Negative for heartburn, nausea, vomiting, abdominal pain, diarrhea, constipation, blood in stool and melena.   Genitourinary: Negative for dysuria, urgency, frequency, hematuria and flank pain.   Musculoskeletal: Negative for myalgias, back pain, joint pain and falls.   Skin: Negative for itching and rash.  Neurological: Negative for dizziness, tingling, tremors, sensory change, speech change, focal weakness, seizures, loss of consciousness, weakness and headaches.   Endo/Heme/Allergies: Negative for environmental allergies and polydipsia. Does not bruise/bleed easily.   Psychiatric/Behavioral: Negative for depression, suicidal ideas, hallucinations, memory loss and substance abuse. The patient is not nervous/anxious and does not have insomnia.    Physical Exam:  /62   Pulse 67   Temp 36.4 °C (97.6 °F) (Temporal)   Resp 16   Ht 1.702 m (5' 7\")   Wt 87.9 kg (193 lb 12.6 oz)   SpO2 92%   Vitals:    04/16/22 1003   BP: 142/62   Pulse: 67   Resp: 16   Temp:    SpO2: 92%     GENERAL:  Otherwise healthy-appearing and in no acute distress  HEENT:  Atraumatic, normocephalic.  Normal pinna bilaterally.  External auditory canals are without discharge.  Conjunctivae and sclerae are clear. Extraocular movements are full. Pupils are equal, round, and reactive to light.  Oral mucosa " is moist.  NECK:  Soft and supple without lymphadenopathy. No masses are noted.  Thyroid is of normal size and texture.  Trachea is midline.  CHEST:  Lungs are clear to auscultation bilaterally.  No masses, lesions, or signs of trauma were noted.     CARDIOVASCULAR:  Regular rate and rhythm.  No murmurs appreciated.  No JVD.  Palpable pulses present in all four extremities.    ABDOMEN:  Soft, focally tender over the RUQ, non-distended.  Non-tympanitic.  No hepatomegaly or splenomegaly.  No incisional, umbilical hernias were appreciated.  GENITOURINARY:  Normal external reproductive anatomy.  MUSCULOSKELETAL: Normal range of motion x4 extremities.    SKIN:  Warm and well perfused. No rashes.  NEUROLOGIC:  Alert and oriented. Cranial nerves II through XII are grossly intact. Motor and sensory exams are normal in all four extremities. Motor and sensory reflexes are 2+ and symmetric with bilateral plantar responses.  PSYCHIATRIC: Affect and mood is appropriate for age and condition.    Labs:  Recent Labs     04/16/22  0825   WBC 14.5*   RBC 4.91   HEMOGLOBIN 15.7   HEMATOCRIT 46.9   MCV 95.5   MCH 32.0   MCHC 33.5*   RDW 49.1   PLATELETCT 294   MPV 9.2     Recent Labs     04/16/22  0825   SODIUM 141   POTASSIUM 4.5   CHLORIDE 105   CO2 23   GLUCOSE 111*   BUN 17   CREATININE 0.57   CALCIUM 9.6         Recent Labs     04/16/22  0825   ASTSGOT 16   ALTSGPT 24   TBILIRUBIN 0.4   ALKPHOSPHAT 112   GLOBULIN 2.9       Radiology:  DX-CHEST-PORTABLE (1 VIEW)   Final Result      Mild left basilar atelectasis      US-RUQ   Final Result      1.  Cholelithiasis with borderline gallbladder wall thickening. Findings could represent early acute cholecystitis in the appropriate clinical setting      2.  Increased hepatic echotexture is consistent with fatty infiltration. Hepatocellular disease can have a similar appearance in the appropriate clinical setting.      3.  No sonographic evidence for biliary obstruction.           Assessment/Plan:   Acute calculous cholecystitis- (present on admission)  Assessment & Plan  Known cholelithiasis dating back to 1/2022  Persistent pain for the past week  RUQ U/S consistent with cholecystitis  LFTs normal  4/16 Lap Rosana pending  ACS [facility] Service    Wegener's granulomatosis without renal involvement (HCC)- (present on admission)  Assessment & Plan  Seen at [facility]  Methotrexate qSunday  Normal renal function        The patient had been taking high dose ibuprofen for pain as recently at yesterday.  We discussed that this could increase her chances of bleeding during and after surgery.  She is also on continuous methotrexate therapy.  We discussed this can increase the chance of a post-operative infection.  A type and cross and platelet mapping are pending.  The patient voiced understanding of these risks and wishes to proceed with surgery to alleviate her pain and so she can start Rituxan for her Wegener's.      Given the above presentation, the patient will be taken to the operating room for laparoscopic, possible open cholecystectomy. The surgical plan rationale for surgery was discussed in detail and in layman's terms with the patient and available family. Potential complications were discussed in detail and include but are not limited to infection, bleeding, damage to adjacent tissues and organs, pneumonia, anesthetic complications and death on very rare occasions. Questions and concerns were addressed to the patient's and available family's apparent satisfaction.  It was agreed to proceed.  Operative consent was obtained.    I independently reviewed pertinent clinical lab tests since admission and ordered additional follow up clinical lab tests.  I independently reviewed pertinent radiographic images and the radiologist's reports since admission and ordered additional follow up radiographic studies.  The details of the available patient records in [facility] (including laboratory tests,  culture data, medications, imaging, and other pertinent diagnostic tests) and that information was utilized as warranted in today's medical decision making for this patient.  I personally evaluated the patient condition at bedside.    Care interventions include:   Review of interval medical and surgical history, current medications and outpatient medication reconciliation, interval imaging studies and radiologist interpretation and interval laboratory values.  Management of acute cholecystitis.    Aggregated care time spent evaluating, reassessing, reviewing documentation, providing care, and managing this patient exclusive of procedures: 60 minutes  ____________________________________   Kiko Lal MD, FACS   JRU / NTS     DD: 4/16/2022   DT: 10:35 AM

## 2022-04-16 NOTE — CARE PLAN
The patient is Stable - Low risk of patient condition declining or worsening    Shift Goals  Clinical Goals: surgery  Patient Goals: pain relief  Family Goals: discharge    Progress made toward(s) clinical / shift goals:      Problem: Pain - Standard  Goal: Alleviation of pain or a reduction in pain to the patient’s comfort goal  Outcome: Progressing     Problem: Knowledge Deficit - Standard  Goal: Patient and family/care givers will demonstrate understanding of plan of care, disease process/condition, diagnostic tests and medications  Outcome: Progressing       Patient is not progressing towards the following goals:

## 2022-04-16 NOTE — ASSESSMENT & PLAN NOTE
Seen at [facility]  Methotrexate qSunday  Normal renal function

## 2022-04-16 NOTE — ED NOTES
Med rec completed per pt at bedside with some of her RX bottles  Bottles reviewed and returned   Allergies reviewed   Pt finished a Z Obinna for a sinus infection last week

## 2022-04-17 VITALS
TEMPERATURE: 97.4 F | WEIGHT: 191.8 LBS | SYSTOLIC BLOOD PRESSURE: 99 MMHG | DIASTOLIC BLOOD PRESSURE: 58 MMHG | HEART RATE: 74 BPM | OXYGEN SATURATION: 94 % | RESPIRATION RATE: 18 BRPM | BODY MASS INDEX: 30.1 KG/M2 | HEIGHT: 67 IN

## 2022-04-17 PROCEDURE — A9270 NON-COVERED ITEM OR SERVICE: HCPCS | Performed by: SURGERY

## 2022-04-17 PROCEDURE — G0378 HOSPITAL OBSERVATION PER HR: HCPCS

## 2022-04-17 PROCEDURE — 700105 HCHG RX REV CODE 258: Performed by: SURGERY

## 2022-04-17 PROCEDURE — 700102 HCHG RX REV CODE 250 W/ 637 OVERRIDE(OP): Performed by: SURGERY

## 2022-04-17 RX ORDER — ACETAMINOPHEN 325 MG/1
650 TABLET ORAL EVERY 4 HOURS PRN
Qty: 30 TABLET | Refills: 0 | Status: SHIPPED | COMMUNITY
Start: 2022-04-17 | End: 2023-12-26

## 2022-04-17 RX ADMIN — FOLIC ACID 1 MG: 1 TABLET ORAL at 05:13

## 2022-04-17 RX ADMIN — SODIUM CHLORIDE, POTASSIUM CHLORIDE, SODIUM LACTATE AND CALCIUM CHLORIDE: 600; 310; 30; 20 INJECTION, SOLUTION INTRAVENOUS at 05:13

## 2022-04-17 ASSESSMENT — PAIN DESCRIPTION - PAIN TYPE: TYPE: SURGICAL PAIN

## 2022-04-17 NOTE — CARE PLAN
The patient is Stable - Low risk of patient condition declining or worsening    Shift Goals  Clinical Goals: Pain control  Patient Goals: Rest, pain control  Family Goals: D/C    Progress made toward(s) clinical / shift goals:      Problem: Pain - Standard  Goal: Alleviation of pain or a reduction in pain to the patient’s comfort goal  Outcome: Progressing     Problem: Knowledge Deficit - Standard  Goal: Patient and family/care givers will demonstrate understanding of plan of care, disease process/condition, diagnostic tests and medications  Outcome: Progressing

## 2022-04-17 NOTE — PROGRESS NOTES
Report received.  Assumed Care.  Pt in bed, resting with family at bedside. AOx4, responds appropriately. Pain 5/10.  Denies SOB.  Plan of care discussed.  Explained importance of calling before getting OOB and pt verbalizes understanding.  Reviewed POC, pt oriented to room, call light and belongings within reach, treaded slipper socks on, bed in lowest position.

## 2022-04-17 NOTE — CARE PLAN
Problem: Pain - Standard  Goal: Alleviation of pain or a reduction in pain to the patient’s comfort goal  Outcome: Progressing     Problem: Knowledge Deficit - Standard  Goal: Patient and family/care givers will demonstrate understanding of plan of care, disease process/condition, diagnostic tests and medications  Outcome: Progressing   The patient is Stable - Low risk of patient condition declining or worsening    Shift Goals  Clinical Goals: pain control  Patient Goals: rest,pain control  Family Goals: D/C    Progress made toward(s) clinical / shift goals:  patient updated on POC    Patient is not progressing towards the following goals:

## 2022-04-17 NOTE — PROGRESS NOTES
Assessment completed. Pt A&Ox 4. Respirations are even and unlabored on room air. Pt denies pain at this time. Medical patient, VS stable, call light and belongings within reach. POC updated (Discharge). Pt educated on room and call light, pt verbalized understanding. Communication board updated. Needs met.

## 2022-04-17 NOTE — ANESTHESIA TIME REPORT
Anesthesia Start and Stop Event Times     Date Time Event    4/16/2022 1527 Ready for Procedure     1547 Anesthesia Start     1700 Anesthesia Stop        Responsible Staff  04/16/22    Name Role Begin End    Mario Gotti M.D. Anesth 1547 1700        Overtime Reason:  no overtime (within assigned shift)    Comments: ISMAEL #2 WEEKEND

## 2022-04-17 NOTE — DISCHARGE SUMMARY
Discharge Summary    DATE OF ADMISSION: 4/16/2022    DATE OF DISCHARGE: 4/17/2022    DISCHARGE DIAGNOSIS:  ? Acute acalculous cholecystitis  ? Wegener's granulomatosis without renal involvement    CONSULTATIONS:  ? none    PROCEDURES:  ? Procedure completed by Dr. Lal on 4/16/2022: Laparoscopic cholecystectomy    BRIEF HPI and HOSPITAL COURSE:  ? Admitted with above, see admission history and physical. Underwent procedure as above. On day of discharge, the patient has stable vital signs, on room air, tolerating an oral diet, and pain is well controlled with PO meds. The patient is stable for discharge to home.    DISPOSITION: Discharged home on 4/17/2022. The patient and family were counseled and questions were answered. Specifically, signs and symptoms of infection, respiratory decompensation and persistent or worsening pain were discussed and the patient agrees to seek medical attention if any of these develop.    DISCHARGE MEDICATIONS:  The patients controlled substance history was reviewed and a controlled substance use informed consent (if applicable) was provided by [facility] and the patient has been prescribed.     Medication List      Start taking these medications      Instructions   acetaminophen 325 MG Tabs  Commonly known as: Tylenol   Take 2 Tablets by mouth every four hours as needed.  Dose: 650 mg        Continue taking these medications      Instructions   azithromycin 250 MG Tabs  Commonly known as: ZITHROMAX   Take 250-500 mg by mouth every day. Z [place]: 500 mg day 1, then 250 mg days 2-5  Dose: 250-500 mg     folic acid 1 MG Tabs  Commonly known as: FOLVITE   Take 1 mg by mouth every day.  Dose: 1 mg     ibuprofen 200 MG Tabs  Commonly known as: MOTRIN   Take 800 mg by mouth 1 time a day as needed for Mild Pain.  Dose: 800 mg     methotrexate 2.5 MG Tabs   Take 15 mg by mouth every Sunday.  Dose: 15 mg     promethazine 25 MG Tabs  Commonly known as: PHENERGAN   Take 25  mg by mouth 1 time a day as needed for Nausea/Vomiting. Pt takes with weekly methotrexate  Dose: 25 mg     simvastatin 20 MG Tabs  Commonly known as: ZOCOR   Take 20 mg by mouth every evening.  Dose: 20 mg     zolpidem 10 MG Tabs  Commonly known as: AMBIEN   Take 10 mg by mouth at bedtime.  Dose: 10 mg            ACTIVITY:  No strenuous exercise or heavy lifting (more than 15 lbs) until released in follow up.    WOUND CARE:  You may shower, but do not submerge in a bath for at least two weeks. If you have wound dressings, they may come off after 48 hours. If you have skin glue to the wound, this will fall off on its own, do not pick at it. If you have steri strips to the wound, these will fall off on their own, do not pick at them, may trim the edges if needed.    DIET:  Orders Placed This Encounter   Procedures   • Diet Order Diet: Regular     Standing Status:   Standing     Number of Occurrences:   1     Order Specific Question:   Diet:     Answer:   Regular [1]       FOLLOW UP:  Biju Rivera M.D.  [address] [facility] [id number] [phone]    Schedule an appointment as soon as possible for a visit in 1 week      Demario Lal M.D.  [address] [id number] [phone]    Call  As needed      TIME SPENT ON DISCHARGE: 20 minutes      ____________________________________________  SILVER Bolanos    DD: 4/17/2022 8:41 AM

## 2022-04-17 NOTE — OR NURSING
Pt is aaox4, resting comfortably in [facility] on 3lpm nasal cannula. She is treated for pain and nausea per MAR with success. She takes sips of fluids without difficulty or vomiting. Her abdomen is soft and slightly tender to palpation. Surgical sites are clean, dry and soft to the touch. No drainage or hematomas noted. Respirations are equal and unlabored, she is in no acute distress. Will continue to monitor.

## 2022-04-17 NOTE — ANESTHESIA POSTPROCEDURE EVALUATION
Patient: Celine Li    Procedure Summary     Date: 04/16/22 Room / Location: [place] [address] / SURGERY [facility]    Anesthesia Start: 1547 Anesthesia Stop: 1700    Procedure: CHOLECYSTECTOMY, LAPAROSCOPIC (N/A ) Diagnosis: (ACUTE CHOLECYSTITIS)    Surgeons: Demario Lal M.D. Responsible Provider: Mario Gotti M.D.    Anesthesia Type: general ASA Status: 2 - Emergent          Final Anesthesia Type: general  Last vitals  BP   Blood Pressure: 103/56    Temp   36.2 °C (97.1 °F)    Pulse   62   Resp   16    SpO2   97 %      Anesthesia Post Evaluation    Patient location during evaluation: PACU  Patient participation: complete - patient participated  Level of consciousness: awake and alert  Pain score: 1    Airway patency: patent  Anesthetic complications: no  Cardiovascular status: hemodynamically stable  Respiratory status: acceptable  Hydration status: euvolemic    PONV: none          No complications documented.     Nurse Pain Score: 5 (NPRS)

## 2022-04-17 NOTE — PROGRESS NOTES
Pt is transported up to floor by Christiano linares on 3 lpm on a full tank of O2. The pt is completely awake and in no acute distress prior to leaving the PACU. Pain is at a tolerable level and are not exhibiting any n/v.     Handoff report given to Mindy JUAREZ on the receiving unit and aware of pt arrival.

## 2022-04-17 NOTE — DISCHARGE INSTRUCTIONS
Laparoscopic Cholecystectomy, Care After  This sheet gives you information about how to care for yourself after your procedure. Your doctor may also give you more specific instructions. If you have problems or questions, contact your doctor.  Follow these instructions at home:  Care for cuts from surgery (incisions)    · Follow instructions from your doctor about how to take care of your cuts from surgery. Make sure you:  ? Wash your hands with soap and water before you change your bandage (dressing). If you cannot use soap and water, use hand .  ? Change your bandage as told by your doctor.  ? Leave stitches (sutures), skin glue, or skin tape (adhesive) strips in place. They may need to stay in place for 2 weeks or longer. If tape strips get loose and curl up, you may trim the loose edges. Do not remove tape strips completely unless your doctor says it is okay.  · Do not take baths, swim, or use a hot tub until your doctor says it is okay. Ask your doctor if you can take showers. You may only be allowed to take sponge baths for bathing.  · Check your surgical cut area every day for signs of infection. Check for:  ? More redness, swelling, or pain.  ? More fluid or blood.  ? Warmth.  ? Pus or a bad smell.  Activity  · Do not drive or use heavy machinery while taking prescription pain medicine.  · Do not lift anything that is heavier than 10 lb (4.5 kg) until your doctor says it is okay.  · Do not play contact sports until your doctor says it is okay.  · Do not drive for 24 hours if you were given a medicine to help you relax (sedative).  · Rest as needed. Do not return to work or school until your doctor says it is okay.  General instructions  · Take over-the-counter and prescription medicines only as told by your doctor.  · To prevent or treat constipation while you are taking prescription pain medicine, your doctor may recommend that you:  ? Drink enough fluid to keep your pee (urine) clear or pale  yellow.  ? Take over-the-counter or prescription medicines.  ? Eat foods that are high in fiber, such as fresh fruits and vegetables, whole grains, and beans.  ? Limit foods that are high in fat and processed sugars, such as fried and sweet foods.  Contact a doctor if:  · You develop a rash.  · You have more redness, swelling, or pain around your surgical cuts.  · You have more fluid or blood coming from your surgical cuts.  · Your surgical cuts feel warm to the touch.  · You have pus or a bad smell coming from your surgical cuts.  · You have a fever.  · One or more of your surgical cuts breaks open.  Get help right away if:  · You have trouble breathing.  · You have chest pain.  · You have pain that is getting worse in your shoulders.  · You faint or feel dizzy when you stand.  · You have very bad pain in your belly (abdomen).  · You are sick to your stomach (nauseous) for more than one day.  · You have throwing up (vomiting) that lasts for more than one day.  · You have leg pain.  This information is not intended to replace advice given to you by your health care provider. Make sure you discuss any questions you have with your health care provider.  Document Released: 09/26/2009 Document Revised: 11/30/2018 Document Reviewed: 06/05/2017  [employer] Patient Education © 2020 [employer]      Laparoscopic Colectomy, Care After  This sheet gives you information about how to care for yourself after your procedure. Your health care provider may also give you more specific instructions. If you have problems or questions, contact your health care provider.  What can I expect after the procedure?  After your procedure, it is common to have the following:  · Pain in your abdomen, especially in the incision areas. You will be given medicine to control the pain.  · Tiredness. This is a normal part of the recovery process. Your energy level will return to normal over the next several weeks.  · Changes in your bowel movements,  such as constipation or needing to go more often. Talk with your health care provider about how to manage this.  Follow these instructions at home:  Medicines  · Take over-the-counter and prescription medicines only as told by your health care provider.  · Do not drive or use heavy machinery while taking prescription pain medicine.  · Do not drink alcohol while taking prescription pain medicine.  · If you were prescribed an antibiotic medicine, use it as told by your health care provider. Do not stop using the antibiotic even if you start to feel better.  Incision care    · Follow instructions from your health care provider about how to take care of your incision areas. Make sure you:  ? Keep your incisions clean and dry.  ? Wash your hands with soap and water before and after applying medicine to the areas, and before and after changing your bandage (dressing). If soap and water are not available, use hand .  ? Change your dressing as told by your health care provider.  ? Leave stitches (sutures), skin glue, or adhesive strips in place. These skin closures may need to stay in place for 2 weeks or longer. If adhesive strip edges start to loosen and curl up, you may trim the loose edges. Do not remove adhesive strips completely unless your health care provider tells you to do that.  · Do not wear tight clothing over the incisions. Tight clothing may rub and irritate the incision areas, which may cause the incisions to open.  · Do not take baths, swim, or use a hot tub until your health care provider approves. Ask your health care provider if you can take showers. You may only be allowed to take sponge baths for bathing.  · Check your incision area every day for signs of infection. Check for:  ? More redness, swelling, or pain.  ? More fluid or blood.  ? Warmth.  ? Pus or a bad smell.  Activity  · Avoid lifting anything that is heavier than 10 lb (4.5 kg) for 2 weeks or until your health care provider says it  is okay.  · You may resume normal activities as told by your health care provider. Ask your health care provider what activities are safe for you.  · Take rest breaks during the day as needed.  Eating and drinking  · Follow instructions from your health care provider about what you can eat after surgery.  · To prevent or treat constipation while you are taking prescription pain medicine, your health care provider may recommend that you:  ? Drink enough fluid to keep your urine clear or pale yellow.  ? Take over-the-counter or prescription medicines.  ? Eat foods that are high in fiber, such as fresh fruits and vegetables, whole grains, and beans.  ? Limit foods that are high in fat and processed sugars, such as fried and sweet foods.  General instructions  · Ask your health care provider when you will need an appointment to get your sutures or staples removed.  · Keep all follow-up visits as told by your health care provider. This is important.  Contact a health care provider if:  · You have more redness, swelling, or pain around your incisions.  · You have more fluid or blood coming from the incisions.  · Your incisions feel warm to the touch.  · You have pus or a bad smell coming from your incisions or your dressing.  · You have a fever.  · You have an incision that breaks open (edges not staying together) after sutures or staples have been removed.  Get help right away if:  · You develop a rash.  · You have chest pain or difficulty breathing.  · You have pain or swelling in your legs.  · You feel light-headed or you faint.  · Your abdomen swells (becomes distended).  · You have nausea or vomiting.  · You have blood in your stool (feces).  This information is not intended to replace advice given to you by your health care provider. Make sure you discuss any questions you have with your health care provider.  Document Released: 07/07/2006 Document Revised: 09/06/2019 Document Reviewed: 09/18/2017  [employer]vier Patient  Education © 2020 [employer]    Discharge Instructions    Discharged to home by car with relative. Discharged via wheelchair, hospital escort: Yes.  Special equipment needed: Not Applicable    Be sure to schedule a follow-up appointment with your primary care doctor or any specialists as instructed.     Discharge Plan:   Diet Plan: Discussed  Activity Level: Discussed  Confirmed Follow up Appointment: Appointment Scheduled  Confirmed Symptoms Management: Discussed  Medication Reconciliation Updated: Yes    I understand that a diet low in cholesterol, fat, and sodium is recommended for good health. Unless I have been given specific instructions below for another diet, I accept this instruction as my diet prescription.   Other diet: Regular diet    Special Instructions: None    · Is patient discharged on Warfarin / Coumadin?   No     Depression / Suicide Risk    As you are discharged from this [facility] facility, it is important to learn how to keep safe from harming yourself.    Recognize the warning signs:  · Abrupt changes in personality, positive or negative- including increase in energy   · Giving away possessions  · Change in eating patterns- significant weight changes-  positive or negative  · Change in sleeping patterns- unable to sleep or sleeping all the time   · Unwillingness or inability to communicate  · Depression  · Unusual sadness, discouragement and loneliness  · Talk of wanting to die  · Neglect of personal appearance   · Rebelliousness- reckless behavior  · Withdrawal from people/activities they love  · Confusion- inability to concentrate     If you or a loved one observes any of these behaviors or has concerns about self-harm, here's what you can do:  · Talk about it- your feelings and reasons for harming yourself  · Remove any means that you might use to hurt yourself (examples: pills, rope, extension cords, firearm)  · Get professional help from the community (Mental Health, Substance  Abuse, psychological counseling)  · Do not be alone:Call your Safe Contact- someone whom you trust who will be there for you.  · Call your local CRISIS HOTLINE 715-2457 or 752-048-4864  · Call your local Children's Mobile Crisis Response Team [place] (080) 028-8421 or www.Rouxbe  · Call the toll free National Suicide Prevention Hotlines   · National Suicide Prevention Lifeline 812-450-IOFR (1965)  · National Hope Line Network 800-SUICIDE (271-5843)

## 2022-04-18 LAB — PATHOLOGY CONSULT NOTE: NORMAL

## 2023-01-18 RX ORDER — 0.9 % SODIUM CHLORIDE 0.9 %
3 VIAL (ML) INJECTION PRN
Status: CANCELLED | OUTPATIENT
Start: 2023-01-25

## 2023-01-18 RX ORDER — ACETAMINOPHEN 500 MG
1000 TABLET ORAL ONCE
Status: CANCELLED | OUTPATIENT
Start: 2023-01-25

## 2023-01-18 RX ORDER — METHYLPREDNISOLONE SODIUM SUCCINATE 125 MG/2ML
125 INJECTION, POWDER, LYOPHILIZED, FOR SOLUTION INTRAMUSCULAR; INTRAVENOUS ONCE
Status: CANCELLED | OUTPATIENT
Start: 2023-01-25 | End: 2023-01-25

## 2023-01-18 RX ORDER — 0.9 % SODIUM CHLORIDE 0.9 %
VIAL (ML) INJECTION PRN
Status: CANCELLED | OUTPATIENT
Start: 2023-01-25

## 2023-01-18 RX ORDER — 0.9 % SODIUM CHLORIDE 0.9 %
10 VIAL (ML) INJECTION PRN
Status: CANCELLED | OUTPATIENT
Start: 2023-01-25

## 2023-01-18 RX ORDER — DIPHENHYDRAMINE HCL 25 MG
25 TABLET ORAL ONCE
Status: CANCELLED | OUTPATIENT
Start: 2023-01-25 | End: 2023-01-25

## 2023-01-18 RX ORDER — HEPARIN SODIUM (PORCINE) LOCK FLUSH IV SOLN 100 UNIT/ML 100 UNIT/ML
500 SOLUTION INTRAVENOUS PRN
Status: CANCELLED | OUTPATIENT
Start: 2023-01-25

## 2023-03-14 ASSESSMENT — RHEUMATOLOGY NEW PATIENT QUESTIONNAIRE
EYE REDNESS: Y
NUMBNESS: Y
GENITAL ULCERS: N
BLURRINESS: Y
SINONASAL CONGESTION: Y
BLOODY DIARRHEA: N
HAIR SHEDDING: Y
DEPRESSION: Y
DIFFICULTY SPEAKING: N
HEARING LOSS: N
ABDOMINAL PAIN: N
HAIR LOSS WITH BALD SPOTS: Y
THYROID PAIN: N
FEVERS: Y
JOINT SWELLING: Y
BLEEDING GUMS: N
DRY COUGH: N
SUNLIGHT-INDUCED SKIN RASH: ARMS
VERTIGO: Y
SINUS PAIN: Y
HEADACHES: N
CHEST PAIN WITH BREATHING: N
SUNLIGHT-INDUCED SKIN RASH: NECK
DRY MOUTH: N
TEMPLE PAIN: N
IRREGULAR BEATS: N
SHORTNESS OF BREATH: N
BLOOD IN URINE: N
PALPITATIONS: N
SWOLLEN GLANDS: AROUND NECK
NIGHT SWEATS: N
SUNLIGHT-INDUCED SKIN RASH: FACE
BURNING URINATION: N
INEFFECTIVE_MEDICATIONS: PREDNISONE
BLOODY CONSTIPATION: N
RATE_1TO10: 8
BODY ACHES: Y
SKIN PLAQUES: N
COLD-INDUCED COLOR CHANGES (WHITE, PURPLE, RED ON REWARMING): FINGERS
ANXIETY: Y
NOSEBLEEDS: Y
MARK ALL THE AREAS OF PAIN: 83496932
MUSCLE WEAKNESS: N
SKIN THICKENING: N
PERSONAL OR EMOTIONAL STRESSORS: UNABLE TO WORK
MUSCLE PAIN: N
COUGH WITH BLOODY PHLEGM: Y
VISION LOSS: Y
TINGLING: Y
SORE THROAT: N
NAUSEA: Y
NECK PAIN: N
PELVIC PAIN: N
RINGING IN EARS: Y
DIZZINESS: N
CHILLS: Y
ORAL ULCERS: Y
HEARTBURN: N
UNINTENTIONAL WEIGHT LOSS: >20 LBS
DIFFICULTY SWALLOWING: N
SPASMS: N
SUNLIGHT-INDUCED SKIN RASH: CHEST
ABNORMAL DISCHARGE: N
FROTHY URINE: N
CAVITIES: N
SNORING: N
EAR PAIN: Y
NASAL ULCERS: Y
EYE PAIN: Y
GOITER: N
VOMITING: Y
DRY EYES: N
ACHILLES TENDON PAIN: N
MORNING STIFFNESS: <30 MINS
JOINT PAIN: SAME WITH ACTIVITY

## 2023-03-17 ENCOUNTER — OFFICE VISIT (OUTPATIENT)
Dept: RHEUMATOLOGY | Facility: MEDICAL CENTER | Age: 58
End: 2023-03-17
Attending: STUDENT IN AN ORGANIZED HEALTH CARE EDUCATION/TRAINING PROGRAM
Payer: COMMERCIAL

## 2023-03-17 VITALS
TEMPERATURE: 97.6 F | DIASTOLIC BLOOD PRESSURE: 66 MMHG | OXYGEN SATURATION: 100 % | HEIGHT: 67 IN | WEIGHT: 198 LBS | BODY MASS INDEX: 31.08 KG/M2 | HEART RATE: 78 BPM | SYSTOLIC BLOOD PRESSURE: 114 MMHG

## 2023-03-17 DIAGNOSIS — D84.9 IMMUNOSUPPRESSED STATUS (HCC): ICD-10-CM

## 2023-03-17 DIAGNOSIS — J32.4 CHRONIC PANSINUSITIS: ICD-10-CM

## 2023-03-17 DIAGNOSIS — M31.30 GRANULOMATOSIS WITH POLYANGIITIS OF NOSE (HCC): ICD-10-CM

## 2023-03-17 PROCEDURE — 99205 OFFICE O/P NEW HI 60 MIN: CPT | Performed by: STUDENT IN AN ORGANIZED HEALTH CARE EDUCATION/TRAINING PROGRAM

## 2023-03-17 PROCEDURE — 99212 OFFICE O/P EST SF 10 MIN: CPT | Performed by: STUDENT IN AN ORGANIZED HEALTH CARE EDUCATION/TRAINING PROGRAM

## 2023-03-17 PROCEDURE — 99417 PROLNG OP E/M EACH 15 MIN: CPT | Performed by: STUDENT IN AN ORGANIZED HEALTH CARE EDUCATION/TRAINING PROGRAM

## 2023-03-17 RX ORDER — HYDROCODONE BITARTRATE AND ACETAMINOPHEN 5; 325 MG/1; MG/1
TABLET ORAL
COMMUNITY
Start: 2023-02-27 | End: 2023-05-22

## 2023-03-17 RX ORDER — SULFAMETHOXAZOLE AND TRIMETHOPRIM 800; 160 MG/1; MG/1
TABLET ORAL
COMMUNITY
Start: 2023-03-08

## 2023-03-17 RX ORDER — AMITRIPTYLINE HYDROCHLORIDE 10 MG/1
TABLET, FILM COATED ORAL
COMMUNITY
Start: 2023-03-08

## 2023-03-17 RX ORDER — LIDOCAINE AND PRILOCAINE 25; 25 MG/G; MG/G
CREAM TOPICAL
COMMUNITY
Start: 2023-01-17

## 2023-03-17 RX ORDER — GABAPENTIN 100 MG/1
CAPSULE ORAL
COMMUNITY
Start: 2022-12-23 | End: 2023-03-18

## 2023-03-17 ASSESSMENT — FIBROSIS 4 INDEX: FIB4 SCORE: 0.64

## 2023-03-17 NOTE — PROGRESS NOTES
[facility] RHEUMATOLOGY  [address], [address], [place] [address]  Phone: [phone] ? Fax: [phone]    RHEUMATOLOGY NEW PATIENT VISIT NOTE      DATE OF SERVICE: 03/17/2023         Subjective     REFERRING PRACTITIONER:  Artem Estrella D.O.  [address]  [place] [address]    PATIENT IDENTIFICATION:  Celine Li  [address] [place] [id number]    YOB: 1965    MEDICAL RECORD NUMBER: [id number]         CHIEF COMPLAINT:   Chief Complaint   Patient presents with    New Patient      Chronic pansinusitis       HISTORY OF PRESENT ILLNESS:  Celine Li is a 57 y.o. 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 Previously under the care of [facility] Rhe[facility] in [place], she presents to establish care for continued evaluation and management of her GPA. Reports onset of GPA symptoms in 3/2020 with worsening of her chronic pansinusitis leading to nasal septum damage with bloody discharge, fluid-filled sinuses, facial pressure, and ear pressure with vertigo all of which led to her eventual diagnosis of GPA. Reports that though these have improved with treatment since diagnosis, they have been waxing/waning over time with variable degrees of mild to moderate symptoms. Additionally reports history of photosensitive rash (on face, neck, chest, and arms), cold-induced color changes of fingers, and tingling/numbness of feet. Reports other aspects of her symptoms and medical history as noted on  the questionnaire below.    Pertinent treatment history: Prednisone high-to-low dose (caused suicidal ideation), Rituxan 500 mg IV every 6 months (1/2021-present, last dose on 3/7/23), methotrexate 15 > 10 mg oral weekly with folic acid 1 mg daily (1/2021-present, dose decreased in 2/2023 due to elevated liver enzymes).    Pertinent lab results history: Normal eGFR, creatinine, LFTs, and unremarkable CBC (in 2/2023).    [facility] New Patient History Form    3/14/2023  2:47 PM PDT - Filed by Patient   Patient Information   Occupation: housewife   Highest Level of Education: 12   Chief Complaint Establish a Rheumatologist for Allie's Granulomatosis   History of Present Illness   Date of symptom onset (month/year): 11/2020   Preceding incident/ailment: Allie's Granulomatosis   Describe/list your symptoms: Septum is completely gone, fluid filled sinuses, facial pressure, bloody noses, neuropathy in feet,  pressure in the ears with nausea and vertigo.   Aggravating factors: pressure and infection   Alleviating factors: sinus rinsing 3 times a day   Helpful medications: Methotrexate, Rituxin Infusions, Folic Acid   Ineffective medications: Prednisone   Symptom severity/impact (scale of 1-10): 8   Personal/emotional stressors: Unable to work   [place] Locations Of Pain    REVIEW OF SYSTEMS    General   Fevers Yes   Chills Yes   Night sweats No   Unintentional Weight Loss >20 lbs   Musculoskeletal   Joint pain Same with activity   Morning stiffness <30 mins   Joint swelling Yes   Achilles tendon pain No   Muscle pain No   Body Aches Yes   Dermatologic   Hair loss with bald spots Yes   Hair shedding Yes   Sunlight-induced skin rash Face;  Neck;  Chest;  Arms   Skin thickening No   Skin plaques No   Cold-induced color changes (white, purple, red on rewarming) Fingers   Neurologic/Psychiatric   Muscle weakness No   Spasms No   Tingling Yes   Numbness Yes   Anxiety Yes   Depression Yes   Head/Eyes   Headaches No    Temple pain No   Dizziness No   Dry eyes No   Eye pain Yes   Eye redness Yes   Blurriness Yes   Vision loss Yes   Ears/Nose   Ear pain Yes   Ringing in ears Yes   Vertigo Yes   Hearing loss No   Nasal ulcers Yes   Nosebleeds Yes   Sinus pain Yes   Sinonasal congestion Yes   Snoring No   Mouth/Throat   Oral ulcers Yes   Bleeding gums No   Dry mouth No   Cavities No   Sore throat No   Difficulty speaking No   Difficulty swallowing No   Neck/Lymphatics   Neck pain No   Thyroid pain No   Goiter No   Swollen Glands Around neck   Cardiac/Respiratory   Chest pain with breathing No   Dry cough No   Cough with bloody phlegm Yes   Shortness of breath No   Palpitations No   Irregular beats No   Gastrointestinal   Nausea Yes   Vomiting Yes   Heartburn No   Abdominal pain No   Bloody diarrhea No   Bloody constipation No   Genitourinary   Pelvic Pain No   Genital ulcers No   Abnormal discharge No   Burning urination No   Frothy urine No   Blood in urine No   Medical/Personal History Details   Current Medical Problems: Allie's Granulomatosis   Past/Resolved Medical Problems: Brain Tumors, Hysterectomy, Gallbladder, Appendix, Sinuses   Surgeries/Procedures (include month/year): 11/01 Craniotomy  04/02 Tonsillectomy  12/02  Sinus Surgery  09/04 Hysterectomy  02/05 Appendectomy 11/08 Nyson Wrap  12/2020  Sinus Surgery  04/2022  Gallbladder  09/2021 Trigger Finger   Prescription/Over-The-Counter/Herbal Medications: Methotrexate, Sulfameth, Simvastatin, Ambien, Elavil, Folic Acid, Phenegran, Lidocaine (for [place]), Mupirocin, Tylenol, IB profen   Medication/Food/Material Allergies (include reactions): Penicillin  (vomit, diarrhea, fever)   Immunizations (include month/year) October 2022  Hepatitis, Tetanus, Pneumonia, Flu shot            1846-6680 Covid Vaccine (3)  2022 Evusheld  2022 Shingles Vaccine   Alcohol/Tobacco/Recreational Drugs: Smoke 5-6 cigarettes daily   Family Medical History (only first-degree relatives): Renal  failure/ mother/ father  mother strokes  brother strokes sister renal failure       ACTIVE PROBLEM LIST:  Patient Active Problem List    Diagnosis Date Noted    Chronic pansinusitis 03/18/2023    Immunosuppressed status (HCC) 03/18/2023    Peripheral sensory neuropathy 03/18/2023    Acute calculous cholecystitis 04/16/2022    Granulomatosis with polyangiitis of nose (HCC) 04/16/2022       PAST MEDICAL HISTORY:  History reviewed. No pertinent past medical history.    PAST SURGICAL HISTORY:  Past Surgical History:   Procedure Laterality Date    JERAMY BY LAPAROSCOPY N/A 4/16/2022    Procedure: CHOLECYSTECTOMY, LAPAROSCOPIC;  Surgeon: Demario Lal M.D.;  Location: SURGERY [facility];  Service: General       MEDICATIONS:  Current Outpatient Medications   Medication Sig    amitriptyline (ELAVIL) 10 MG Tab TAKE 1 TO 3 TABLETS BY MOUTH EVERY EVENING AT BEDTIME AS NEEDED    HYDROcodone-acetaminophen (NORCO) 5-325 MG Tab per tablet TAKE ONE TABLET BY MOUTH EVERY 4 HOURS AS NEEDED FOR PAIN FOR 7 DAYS **NOT TO EXCEED 8 TABS PER DAY**    lidocaine-prilocaine (EMLA) 2.5-2.5 % Cream APPLY SPARINGLY TO AREA TOPICALLY PRIOR TO PROCEDURE    sulfamethoxazole-trimethoprim (BACTRIM DS) 800-160 MG tablet TAKE ONE TABLET BY MOUTH EVERY OTHER DAY    mupirocin (BACTROBAN) 2 % Ointment Apply 1 Application. topically 2 times a day.    acetaminophen (TYLENOL) 325 MG Tab Take 2 Tablets by mouth every four hours as needed.    simvastatin (ZOCOR) 20 MG Tab Take 20 mg by mouth every evening.    zolpidem (AMBIEN) 10 MG Tab Take 10 mg by mouth at bedtime.    folic acid (FOLVITE) 1 MG Tab Take 1 mg by mouth every day.    methotrexate 2.5 MG Tab Take 15 mg by mouth every Sunday.    ibuprofen (MOTRIN) 200 MG Tab Take 800 mg by mouth 1 time a day as needed for Mild Pain.    promethazine (PHENERGAN) 25 MG Tab Take 25 mg by mouth 1 time a day as needed for Nausea/Vomiting. Pt takes with weekly methotrexate    azithromycin (ZITHROMAX) 250 MG Tab Take  "250-500 mg by mouth every day. Z [place]: 500 mg day 1, then 250 mg days 2-5       ALLERGIES:   Allergies   Allergen Reactions    Penicillins Vomiting     N/V, fever   Reaction ~15 years ago       IMMUNIZATIONS:  There is no immunization history on file for this patient.    SOCIAL HISTORY:   Social History     Socioeconomic History    Marital status:    Tobacco Use    Smoking status: Every Day     Packs/day: 0.50     Years: 30.00     Pack years: 15.00     Types: Cigarettes    Smokeless tobacco: Never   Vaping Use    Vaping Use: Never used   Substance and Sexual Activity    Alcohol use: Yes     Comment: occ    Drug use: Never       FAMILY HISTORY:  History reviewed. No pertinent family history.         Objective     Vital Signs: /66 (BP Location: Left arm, Patient Position: Sitting, BP Cuff Size: Adult)   Pulse 78   Temp 36.4 °C (97.6 °F) (Temporal)   Ht 1.702 m (5' 7\")   Wt 89.8 kg (198 lb)   SpO2 100% Body mass index is 31.01 kg/m².    General: Appears well and comfortable  Eyes: No scleral or conjunctival lesions  ENT: Crusted hyperemia of bilateral nasal mucosa (left > right)  Head/Neck: No apparent scalp or neck lesions  Cardiovascular: Regular rate and rhythm; no pericardial rubs  Respiratory: Breathing quiet and unlabored; no rales or pleural rubs  Gastrointestinal: No apparent organomegaly or abdominal masses  Integumentary: No significant cutaneous lesions or discolorations  Musculoskeletal: No significant joint tenderness, periarticular soft tissue swelling, warmth, erythema, or overt signs of synovitis; no significant restriction in range of motion of joints examined  Neurologic: No focal sensory or motor deficits  Psychiatric: Mood and affect appropriate      LABORATORY RESULTS REVIEWED AND INTERPRETED BY ME:  Lab Results   Component Value Date/Time    WBC 14.5 (H) 04/16/2022 08:25 AM    RBC 4.91 04/16/2022 08:25 AM    HEMOGLOBIN 15.7 04/16/2022 08:25 AM    HEMATOCRIT 46.9 04/16/2022 08:25 " AM    MCV 95.5 04/16/2022 08:25 AM    MCH 32.0 04/16/2022 08:25 AM    MCHC 33.5 (L) 04/16/2022 08:25 AM    RDW 49.1 04/16/2022 08:25 AM    PLATELETCT 294 04/16/2022 08:25 AM    MPV 9.2 04/16/2022 08:25 AM    NEUTS 10.04 (H) 04/16/2022 08:25 AM    LYMPHOCYTES 20.90 (L) 04/16/2022 08:25 AM    MONOCYTES 7.10 04/16/2022 08:25 AM    EOSINOPHILS 1.80 04/16/2022 08:25 AM    BASOPHILS 0.30 04/16/2022 08:25 AM     Lab Results   Component Value Date/Time    ASTSGOT 16 04/16/2022 08:25 AM    ALTSGPT 24 04/16/2022 08:25 AM    ALKPHOSPHAT 112 (H) 04/16/2022 08:25 AM    TBILIRUBIN 0.4 04/16/2022 08:25 AM    TOTPROTEIN 7.2 04/16/2022 08:25 AM    ALBUMIN 4.3 04/16/2022 08:25 AM     Lab Results   Component Value Date/Time    SODIUM 141 04/16/2022 08:25 AM    POTASSIUM 4.5 04/16/2022 08:25 AM    CHLORIDE 105 04/16/2022 08:25 AM    CO2 23 04/16/2022 08:25 AM    GLUCOSE 111 (H) 04/16/2022 08:25 AM    BUN 17 04/16/2022 08:25 AM    CREATININE 0.57 04/16/2022 08:25 AM    CALCIUM 9.6 04/16/2022 08:25 AM     Lab Results   Component Value Date/Time    COLORURINE Yellow 04/16/2022 10:04 AM    SPECGRAVITY 1.023 04/16/2022 10:04 AM    PHURINE 5.5 04/16/2022 10:04 AM    GLUCOSEUR Negative 04/16/2022 10:04 AM    KETONES Negative 04/16/2022 10:04 AM    PROTEINURIN Negative 04/16/2022 10:04 AM       RADIOLOGY RESULTS REVIEWED AND INTERPRETED BY ME:  No importable results found in the system. Pertinent non-system results summarized under history above.      All relevant laboratory and imaging results reported on this note were reviewed and interpreted by me.         Assessment & Plan     Celine Li is a 57 y.o. female with history as noted above whose presentation merits the following diagnostic and clinical status impressions and recommendations:    1. Granulomatosis with polyangiitis of nose (HCC)  GPA with primarily nasal involvement with clinically relatively moderate disease activity based on present history and physical exam. At this  time, there is no need for escalation of treatment from her current regimen of methotrexate and rituximab of which she had her last infusion on 3/7/23. However, need to ascertain her current immunologic profile based on which additional recommendations will be provided.  - MPO AND PR3 WITH REFLEX TO ANCA; Future  - SHAN REFLEXIVE PROFILE; Future  - COMPLEMENT C3; Future  - COMPLEMENT C4; Future  - COMPLEMENT TOTAL (CH50); Future  - Sed Rate; Future  - CRP QUANTITIVE (NON-CARDIAC); Future  - B - CELL CD20 EXPRESSION; Future  - Continue methotrexate 10 mg oral weekly with folic acid 1 mg daily  - Future rituximab dosing to be determined based on her CD19+/20+ B-cell count    2. Chronic pansinusitis  Predominant manifestation of her GPA which sometimes has superimposed infection that is managed with topical antimicrobial agents.  - Continue mupirocin ointment as prescribed    3. Immunosuppressed status (HCC)  Presently with no reported history, physical exam, or laboratory evidence to suggest significant adverse drug effects or opportunistic infections, but need routine monitoring per guidelines.  - B - CELL CD20 EXPRESSION; Future  - CBC WITH DIFFERENTIAL; Future  - Comp Metabolic Panel; Future  - Need to ascertain age-appropriate vaccines    FOLLOW-UP: Return in about 8 weeks (around 5/12/2023) for Short.      Note: More than 75 minutes were spent on this encounter, including extensive chart review for data acquisition and interpretation, educating and counseling of the patient about her diagnosis, treatment, and prognosis.           Thank you for the opportunity to participate in the care of Celine Li.    Arturo Marc MD, MS  Rheumatologist, [facility] ? [facility]   of Clinical Medicine, Department of Internal Medicine  [facility] ? [facility]

## 2023-03-17 NOTE — PATIENT INSTRUCTIONS
AFTER VISIT INSTRUCTIONS    Below are important information to help you navigate your healthcare needs and help us serve you safely and effectively:  If laboratory tests and/or imaging studies were ordered, remember to go get them done as instructed.  If new prescriptions and/or refills were sent, remember to go pick them up from your local pharmacy, or call the specialty pharmacy to request shipment.  Always take your prescription medications exactly as prescribed unless instructed otherwise.  Note that antirheumatic drugs and steroids are immunosuppressive which means they increase your risk of infections and have multiple potential adverse effects on various organ systems in your body, though most of them are uncommon.  It is important that you are up-to-date on age-appropriate immunizations, particularly shingles and bacterial/viral pneumonia vaccines, which you can request from me or your primary care provider.  Be sure to read the drug package inserts to learn about the potential side effects of your medications before you start taking them.  If you experience any significant drug side effects, stop taking the medication and notify me promptly, and depending on the severity of the side effects, consider going to an urgent care or emergency department for immediate attention.  If there are significant findings on your lab tests and imaging studies that warrant further action, I will notify you with explanations via [employer] or phone call, otherwise you can view them on [employer] and let me know if you have any questions.  Note that [employer] messages are typically read during office hours and may take 1-7 business days before a response depending on the urgency of the situation and how busy my clinic schedule is.  In general, [employer] messaging is for non-urgent matters that do not require immediate attention, so for urgent matters that cannot wait, you are advised to go to an urgent care.  Lastly, you are granted  MyChart access to my documentation of your visit and are encouraged to read my note which details my assessment and plan for your condition.

## 2023-03-18 PROBLEM — G60.8 PERIPHERAL SENSORY NEUROPATHY: Status: ACTIVE | Noted: 2023-03-18

## 2023-03-18 PROBLEM — D84.9 IMMUNOSUPPRESSED STATUS (HCC): Status: ACTIVE | Noted: 2023-03-18

## 2023-03-18 PROBLEM — J32.4 CHRONIC PANSINUSITIS: Status: ACTIVE | Noted: 2023-03-18

## 2023-05-20 ASSESSMENT — RHEUMATOLOGY FOLLOW-UP QUESTIONNAIRE
DURATION: 30-60 MINS
EAR_NOSE: NASAL CONGESTION
MARK ALL THE AREAS OF PAIN: 85999740
NEUROLOGICAL_PSYCHIATRIC: TINGLING
SUNLIGHT-INDUCED SKIN RASH: CHEST
NEUROLOGICAL_PSYCHIATRIC: BURNING
RATE_1TO10: 1
JOINT SWELLING: Y
JOINT PAIN: SAME WITH ACTIVITY
SUNLIGHT-INDUCED SKIN RASH: NECK
NEUROLOGICAL_PSYCHIATRIC: NUMBNESS
INEFFECTIVE_MEDICATIONS: PREDNISONE
CHARACTERISTIC: SAME WITH ACTIVITY
MOUTH_THROAT: ORAL ULCERS
NEUROLOGICAL_PSYCHIATRIC: INSOMNIA

## 2023-05-22 ENCOUNTER — OFFICE VISIT (OUTPATIENT)
Dept: RHEUMATOLOGY | Facility: MEDICAL CENTER | Age: 58
End: 2023-05-22
Attending: STUDENT IN AN ORGANIZED HEALTH CARE EDUCATION/TRAINING PROGRAM
Payer: COMMERCIAL

## 2023-05-22 VITALS
DIASTOLIC BLOOD PRESSURE: 58 MMHG | WEIGHT: 198 LBS | TEMPERATURE: 97.5 F | HEART RATE: 87 BPM | SYSTOLIC BLOOD PRESSURE: 96 MMHG | HEIGHT: 67 IN | OXYGEN SATURATION: 98 % | BODY MASS INDEX: 31.08 KG/M2

## 2023-05-22 DIAGNOSIS — D84.9 IMMUNOSUPPRESSED STATUS (HCC): ICD-10-CM

## 2023-05-22 DIAGNOSIS — M31.30 GRANULOMATOSIS WITH POLYANGIITIS OF NOSE (HCC): ICD-10-CM

## 2023-05-22 DIAGNOSIS — J32.4 CHRONIC PANSINUSITIS: ICD-10-CM

## 2023-05-22 PROCEDURE — 3074F SYST BP LT 130 MM HG: CPT | Performed by: STUDENT IN AN ORGANIZED HEALTH CARE EDUCATION/TRAINING PROGRAM

## 2023-05-22 PROCEDURE — 99212 OFFICE O/P EST SF 10 MIN: CPT | Performed by: STUDENT IN AN ORGANIZED HEALTH CARE EDUCATION/TRAINING PROGRAM

## 2023-05-22 PROCEDURE — 99214 OFFICE O/P EST MOD 30 MIN: CPT | Performed by: STUDENT IN AN ORGANIZED HEALTH CARE EDUCATION/TRAINING PROGRAM

## 2023-05-22 PROCEDURE — 3078F DIAST BP <80 MM HG: CPT | Performed by: STUDENT IN AN ORGANIZED HEALTH CARE EDUCATION/TRAINING PROGRAM

## 2023-05-22 RX ORDER — CYCLOBENZAPRINE HCL 10 MG
TABLET ORAL
COMMUNITY
Start: 2023-05-05

## 2023-05-22 ASSESSMENT — PATIENT HEALTH QUESTIONNAIRE - PHQ9: CLINICAL INTERPRETATION OF PHQ2 SCORE: 0

## 2023-05-22 ASSESSMENT — FIBROSIS 4 INDEX: FIB4 SCORE: 0.64

## 2023-05-22 NOTE — PATIENT INSTRUCTIONS
AFTER VISIT INSTRUCTIONS    Below are important information to help you navigate your healthcare needs and help us serve you safely and effectively:  If laboratory tests and/or imaging studies were ordered, remember to go get them done as instructed.  If new prescriptions and/or refills were sent, remember to go pick them up from your local pharmacy, or call the specialty pharmacy to request shipment.  Always take your prescription medications exactly as prescribed unless instructed otherwise.  Note that antirheumatic drugs and steroids are immunosuppressive which means they increase your risk of infections and have multiple potential adverse effects on various organ systems in your body, though most of them are uncommon.  It is important that you are up-to-date on age-appropriate immunizations, particularly shingles and bacterial/viral pneumonia vaccines, which you can request from me or your primary care provider.  Be sure to read the drug package inserts to learn about the potential side effects of your medications before you start taking them.  If you experience any significant drug side effects, stop taking the medication and notify me promptly, and depending on the severity of the side effects, consider going to an urgent care or emergency department for immediate attention.  If there are significant findings on your lab tests and imaging studies that warrant further action, I will notify you with explanations via [employer] or phone call, otherwise you can view them on [employer] and let me know if you have any questions.  Note that [employer] messages are typically read during office hours and may take 1-7 business days before a response depending on the urgency of the situation and how busy my clinic schedule is.  In general, [employer] messaging is for non-urgent matters that do not require immediate attention, so for urgent matters that cannot wait, you are advised to go to an urgent care.  Lastly, you are granted  Patient MyChart access to my documentation of your visit and are encouraged to read my note which details my assessment and plan for your condition.

## 2023-05-22 NOTE — PROGRESS NOTES
[facility]  [address], [address], [place] [address]  Phone: [phone] ? Fax: [phone]    RHEUMATOLOGY FOLLOW-UP VISIT NOTE      DATE OF SERVICE: 05/22/2023         Subjective     PRIMARY CARE PRACTITIONER:  Biju Rivera M.D.  [address]  [place] [id number]    PATIENT IDENTIFICATION:  Celine Li  [address] [place] [address]    YOB: 1965    MEDICAL RECORD NUMBER: [id number]          CHIEF COMPLAINT:   Chief Complaint   Patient presents with    Follow-Up     Granulomatosis with polyangiitis of nose (HCC)       RHEUMATOLOGIC HISTORY:  Celine Li is a 57 y.o. 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 Previously under the care of [facility] in [place], she initially presented on 3/17/23 to establish care for continued evaluation and management of her GPA. Reported onset of GPA symptoms in 3/2020 with worsening of her chronic pansinusitis leading to nasal septum damage with bloody discharge, fluid-filled sinuses, facial pressure, and ear pressure with vertigo all of which led to her eventual diagnosis of GPA. Reported that though these had improved with treatment since diagnosis, they had been waxing/waning over time with variable degrees of mild to moderate symptoms. Additionally reported history of photosensitive rash (on face, neck, chest, and arms), cold-induced color changes of fingers, and tingling/numbness of feet.    Pertinent  treatment history: Prednisone high-to-low dose (effective but caused suicidal ideation), Rituxan 500 mg IV every 6 months (1/2021-present, last dose on 3/7/23), methotrexate 15>10 mg oral weekly with folic acid 1 mg daily (1/2021-present, dose decreased in 2/2023 due to elevated liver enzymes).    Pertinent laboratory results: CD19+/CD20+ B-cells undetectable, elevated  with normal AST and ALT (in 3-4/2023 at [facility]); negative/normal SHAN, ANCA, anti-MPO, anti-PR3, C3, C4, CH50, ESR, CRP, eGFR, creatinine, and unremarkable CBC (in 3-4/2023 at [facility]).    INTERVAL HISTORY:  [facility] Established Patient History Form    5/20/2023  6:36 AM PDT - Filed by Patient   PRIMARY REASON FOR VISIT To go over tests for GPA.   INTERVAL HISTORY OF ILLNESS   Date of worsening onset: I have been feeling pretty well.  Went to the [facility] last month in [place] and had Silicone Button inserted in to nose.  Had some swelling and little infection.  Feeling better now.   Preceding incident/ailment:    Describe/list your symptoms:    Exacerbating factors:    Alleviating factors:    Helpful medications: The medication that has helped me the most is doing the Rituxan treatment every 6 months.   Ineffective medications: Prednisone   Severity of pain (scale of 1-10): 1   Personal/emotional stressors:    [place] The Locations Of Pain    REVIEW OF SYMPTOMS    General   Fevers    Chills    Night sweats    Malaise    Fatigue    Unintentional weight loss    Musculoskeletal   Joint pain Same with activity   Morning stiffness duration 30-60 mins   Morning stiffness characteristic Same with activity   Joint swelling Yes   Joint instability    Tendon pain    Muscle pain    Body aches    Dermatologic   Hair loss with bald spots    Hair shedding    Skin thickening    Skin plaques    Sunlight-induced skin rash Neck;  Chest   Cold-induced color changes (white, purple, red on rewarming)    Neurologic/Psychiatric Tingling;  Burning;  Numbness;   Insomnia   Head/Eyes    Ears/Nose Nasal congestion   Mouth/Throat Oral ulcers   Neck/Lymphatics   Thyroid pain    Thyroid swelling    Lymph node swelling    Cardiac/Respiratory    Gastrointestinal    Genitourinary        REVIEW OF SYSTEMS:  Except as noted in the history above, relevant review of systems with emphasis on autoimmune rheumatic diseases was otherwise negative.      ACTIVE PROBLEM LIST:  Patient Active Problem List    Diagnosis Date Noted    Chronic pansinusitis 03/18/2023    Immunosuppressed status (HCC) 03/18/2023    Peripheral sensory neuropathy 03/18/2023    Acute calculous cholecystitis 04/16/2022    Granulomatosis with polyangiitis of nose (HCC) 04/16/2022       PAST MEDICAL HISTORY:  History reviewed. No pertinent past medical history.    PAST SURGICAL HISTORY:  Past Surgical History:   Procedure Laterality Date    JERAMY BY LAPAROSCOPY N/A 4/16/2022    Procedure: CHOLECYSTECTOMY, LAPAROSCOPIC;  Surgeon: Demario Lal M.D.;  Location: SURGERY [facility];  Service: General       MEDICATIONS:  Current Outpatient Medications   Medication Sig    cyclobenzaprine (FLEXERIL) 10 mg Tab TAKE ONE TABLET BY MOUTH EVERY EVENING AT BEDTIME    amitriptyline (ELAVIL) 10 MG Tab TAKE 1 TO 3 TABLETS BY MOUTH EVERY EVENING AT BEDTIME AS NEEDED    lidocaine-prilocaine (EMLA) 2.5-2.5 % Cream APPLY SPARINGLY TO AREA TOPICALLY PRIOR TO PROCEDURE    sulfamethoxazole-trimethoprim (BACTRIM DS) 800-160 MG tablet TAKE ONE TABLET BY MOUTH EVERY OTHER DAY    mupirocin (BACTROBAN) 2 % Ointment Apply 1 Application. topically 2 times a day.    acetaminophen (TYLENOL) 325 MG Tab Take 2 Tablets by mouth every four hours as needed.    simvastatin (ZOCOR) 20 MG Tab Take 20 mg by mouth every evening.    zolpidem (AMBIEN) 10 MG Tab Take 10 mg by mouth at bedtime.    folic acid (FOLVITE) 1 MG Tab Take 1 mg by mouth every day.    methotrexate 2.5 MG Tab Take 15 mg by mouth every Sunday.    ibuprofen (MOTRIN) 200 MG Tab Take 800 mg  "by mouth 1 time a day as needed for Mild Pain.    promethazine (PHENERGAN) 25 MG Tab Take 25 mg by mouth 1 time a day as needed for Nausea/Vomiting. Pt takes with weekly methotrexate       ALLERGIES:   Allergies   Allergen Reactions    Penicillins Vomiting     N/V, fever   Reaction ~15 years ago       IMMUNIZATIONS:  There is no immunization history on file for this patient.    SOCIAL HISTORY:   Social History     Socioeconomic History    Marital status:    Tobacco Use    Smoking status: Every Day     Packs/day: 0.50     Years: 30.00     Pack years: 15.00     Types: Cigarettes    Smokeless tobacco: Never   Vaping Use    Vaping Use: Never used   Substance and Sexual Activity    Alcohol use: Yes     Comment: occ    Drug use: Never       FAMILY HISTORY:  History reviewed. No pertinent family history.         Objective     Vital Signs: BP 96/58 (BP Location: Left arm, Patient Position: Sitting, BP Cuff Size: Adult)   Pulse 87   Temp 36.4 °C (97.5 °F) (Temporal)   Ht 1.702 m (5' 7\")   Wt 89.8 kg (198 lb)   SpO2 98% Body mass index is 31.01 kg/m².    General: Appears well and comfortable  Eyes: No scleral or conjunctival lesions  ENT: Mild hyperemia of bilateral nasal mucosa  Head/Neck: No apparent scalp or neck lesions  Cardiovascular: Regular rate and rhythm  Respiratory: Breathing quiet and unlabored  Gastrointestinal: No apparent organomegaly or abdominal masses  Integumentary: No significant cutaneous lesions or discolorations  Musculoskeletal: No significant joint tenderness, periarticular soft tissue swelling, warmth, erythema, or overt signs of synovitis; no significant restriction in range of motion of joints examined  Neurologic: No focal sensory or motor deficits  Psychiatric: Mood and affect appropriate      LABORATORY RESULTS REVIEWED AND INTERPRETED BY ME:  Lab Results   Component Value Date/Time    WBC 14.5 (H) 04/16/2022 08:25 AM    RBC 4.91 04/16/2022 08:25 AM    HEMOGLOBIN 15.7 04/16/2022 " 08:25 AM    HEMATOCRIT 46.9 04/16/2022 08:25 AM    MCV 95.5 04/16/2022 08:25 AM    MCH 32.0 04/16/2022 08:25 AM    MCHC 33.5 (L) 04/16/2022 08:25 AM    RDW 49.1 04/16/2022 08:25 AM    PLATELETCT 294 04/16/2022 08:25 AM    MPV 9.2 04/16/2022 08:25 AM    NEUTS 10.04 (H) 04/16/2022 08:25 AM    LYMPHOCYTES 20.90 (L) 04/16/2022 08:25 AM    MONOCYTES 7.10 04/16/2022 08:25 AM    EOSINOPHILS 1.80 04/16/2022 08:25 AM    BASOPHILS 0.30 04/16/2022 08:25 AM     Lab Results   Component Value Date/Time    ASTSGOT 16 04/16/2022 08:25 AM    ALTSGPT 24 04/16/2022 08:25 AM    ALKPHOSPHAT 112 (H) 04/16/2022 08:25 AM    TBILIRUBIN 0.4 04/16/2022 08:25 AM    TOTPROTEIN 7.2 04/16/2022 08:25 AM    ALBUMIN 4.3 04/16/2022 08:25 AM     Lab Results   Component Value Date/Time    SODIUM 141 04/16/2022 08:25 AM    POTASSIUM 4.5 04/16/2022 08:25 AM    CHLORIDE 105 04/16/2022 08:25 AM    CO2 23 04/16/2022 08:25 AM    GLUCOSE 111 (H) 04/16/2022 08:25 AM    BUN 17 04/16/2022 08:25 AM    CREATININE 0.57 04/16/2022 08:25 AM    CALCIUM 9.6 04/16/2022 08:25 AM     Lab Results   Component Value Date/Time    COLORURINE Yellow 04/16/2022 10:04 AM    SPECGRAVITY 1.023 04/16/2022 10:04 AM    PHURINE 5.5 04/16/2022 10:04 AM    GLUCOSEUR Negative 04/16/2022 10:04 AM    KETONES Negative 04/16/2022 10:04 AM    PROTEINURIN Negative 04/16/2022 10:04 AM       RADIOLOGY RESULTS REVIEWED AND INTERPRETED BY ME:  No loadable results found in the system. Pertinent non-system results summarized under history above.      All relevant laboratory and imaging results reported on this note were reviewed and interpreted by me.         Assessment & Plan     Celine Li is a 57 y.o. female with history as noted above whose presentation merits the following diagnostic and clinical status impressions and recommendations:    1. Granulomatosis with polyangiitis of nose (HCC)  Clinical picture remains compatible with seronegative GPA with primarily nasal involvement that has  responded well to medical and ENT surgical treatment. Presently with no evidence of disease activity, so considering undetectable CD19+/CD20+ B-cells, reasonable to defer her next rituximab infusion until much later as noted below.  - Sed Rate; Future  - CRP QUANTITIVE (NON-CARDIAC); Future  - Rituxan next infusion to be moved from 8/7/23 to 12/7/23  - Continue methotrexate 10 mg oral weekly with folic acid 1 mg daily    2. Chronic pansinusitis  Predominant manifestation of her GPA which sometimes has superimposed infection that is managed with topical antimicrobial agents.  - Sed Rate; Future  - CRP QUANTITIVE (NON-CARDIAC); Future  - Mupirocin ointment twice daily as needed    3. Immunosuppressed status (HCC)  Presently with no reported history, physical exam, or laboratory evidence to suggest significant adverse drug effects or opportunistic infections, but need routine monitoring per guidelines.  - CBC WITH DIFFERENTIAL; Future  - Comp Metabolic Panel; Future  - Need to ascertain age-appropriate vaccines      Note: The above assessment and plan were discussed with the patient who acknowledged understanding of the plan.    FOLLOW-UP: Return in about 4 months (around 9/22/2023) for Short.         Thank you for the opportunity to participate in the care of Celine Li.    Arturo Marc MD, MS  Rheumatologist, [facility] ? [facility]   of Clinical Medicine, Department of Internal Medicine  [facility] ? [facility]

## 2023-09-18 ASSESSMENT — RHEUMATOLOGY FOLLOW-UP QUESTIONNAIRE
MARK ALL THE AREAS OF PAIN: 90885125
TENDON PAIN: Y
CHARACTERISTIC: WORSE WITH ACTIVITY
VERTIGO: Y
HEARTBURN: Y
RATE_1TO10: 6
ORAL ULCERS: Y
EYE REDNESS: Y
DRY EYES: Y
JOINT SWELLING: Y
WEAKNESS: Y
JOINT PAIN: WORSE WITH ACTIVITY
CHIEF_COMPLAINT: FOLLOW UP VISIT
BURNING: Y
LYMPH NODE SWELLING: NECK
NASAL CONGESTION: Y
SINUS PAIN: Y
TINGLING: Y
FATIGUE: Y
HAIR SHEDDING: Y
INSOMNIA: Y
NUMBNESS: Y
DURATION: >1 HOUR

## 2023-09-25 ENCOUNTER — OFFICE VISIT (OUTPATIENT)
Dept: RHEUMATOLOGY | Facility: MEDICAL CENTER | Age: 58
End: 2023-09-25
Attending: STUDENT IN AN ORGANIZED HEALTH CARE EDUCATION/TRAINING PROGRAM
Payer: COMMERCIAL

## 2023-09-25 VITALS
SYSTOLIC BLOOD PRESSURE: 108 MMHG | OXYGEN SATURATION: 97 % | WEIGHT: 198 LBS | BODY MASS INDEX: 31.08 KG/M2 | HEART RATE: 85 BPM | TEMPERATURE: 97.4 F | DIASTOLIC BLOOD PRESSURE: 66 MMHG | HEIGHT: 67 IN

## 2023-09-25 DIAGNOSIS — D84.9 IMMUNOSUPPRESSED STATUS (HCC): ICD-10-CM

## 2023-09-25 DIAGNOSIS — M19.041 PRIMARY OSTEOARTHRITIS OF BOTH HANDS: ICD-10-CM

## 2023-09-25 DIAGNOSIS — M19.042 PRIMARY OSTEOARTHRITIS OF BOTH HANDS: ICD-10-CM

## 2023-09-25 DIAGNOSIS — J32.4 CHRONIC PANSINUSITIS: ICD-10-CM

## 2023-09-25 DIAGNOSIS — M31.30 GRANULOMATOSIS WITH POLYANGIITIS OF NOSE (HCC): ICD-10-CM

## 2023-09-25 PROCEDURE — 99214 OFFICE O/P EST MOD 30 MIN: CPT | Performed by: STUDENT IN AN ORGANIZED HEALTH CARE EDUCATION/TRAINING PROGRAM

## 2023-09-25 PROCEDURE — 3074F SYST BP LT 130 MM HG: CPT | Performed by: STUDENT IN AN ORGANIZED HEALTH CARE EDUCATION/TRAINING PROGRAM

## 2023-09-25 PROCEDURE — 99212 OFFICE O/P EST SF 10 MIN: CPT | Performed by: STUDENT IN AN ORGANIZED HEALTH CARE EDUCATION/TRAINING PROGRAM

## 2023-09-25 PROCEDURE — 3078F DIAST BP <80 MM HG: CPT | Performed by: STUDENT IN AN ORGANIZED HEALTH CARE EDUCATION/TRAINING PROGRAM

## 2023-09-25 RX ORDER — ATORVASTATIN CALCIUM 20 MG/1
20 TABLET, FILM COATED ORAL DAILY
COMMUNITY
Start: 2023-08-28

## 2023-09-25 RX ORDER — FOLIC ACID 1 MG/1
1 TABLET ORAL DAILY
Qty: 90 TABLET | Refills: 3 | Status: SHIPPED | OUTPATIENT
Start: 2023-09-25

## 2023-09-25 ASSESSMENT — FIBROSIS 4 INDEX: FIB4 SCORE: 0.66

## 2023-09-25 NOTE — PROGRESS NOTES
[facility] RHEUMATOLOGY  [address], [address], [place] [address]  Phone: [phone] ? Fax: [phone]  Website: [email or web address]    RHEUMATOLOGY FOLLOW-UP VISIT NOTE      DATE OF SERVICE: 09/25/2023         Subjective     PRIMARY CARE PRACTITIONER:  Biju Rivera M.D.  [address]  [place] [address]    PATIENT IDENTIFICATION:  Celine Li  [address] [place] [address]    YOB: 1965    MEDICAL RECORD NUMBER: [id number]          CHIEF COMPLAINT:   Chief Complaint   Patient presents with    Follow-Up     Joint pain       RHEUMATOLOGIC HISTORY:  Celine Li is a 58 y.o. female with pertinent history notable for granulomatosis with polyangiitis of nose diagnosed in 11/2020 (based on nasal tissue biopsy at 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 Previously under the care of [facility] Rheumatology in [place], she initially presented on 3/17/23 to establish care for continued evaluation and management of her GPA. Reported onset of GPA symptoms in 3/2020 with worsening of her chronic pansinusitis leading to nasal septum damage with bloody discharge, fluid-filled sinuses, facial pressure, and ear pressure with vertigo all of which led to her eventual diagnosis of GPA. Reported that though these had improved with treatment since diagnosis, they had been waxing/waning over time with variable degrees of mild to moderate symptoms. Additionally reported history of photosensitive rash (on face, neck, chest, and arms), cold-induced color changes  of fingers, and tingling/numbness of feet.    Pertinent treatment history: Prednisone high-to-low dose (effective but caused suicidal ideation), Bactrim DS 3 times weekly (3/2022-present), Rituxan 500 mg IV every 6 months (1/2021-present, last dose 3/2023), methotrexate 15>10 mg oral weekly with folic acid 1 mg daily (started 1/2021, presumably caused elevated liver enzymes, dose decreased 2/2023-present, effective).    Pertinent laboratory results: CD19+/CD20+ B-cells undetectable (in 3/2023); elevated <128 with normal AST and ALT (in 9/2023); negative/normal SHAN, ANCA, anti-MPO, anti-PR3, C3, C4, and CH50 (in 3/2023), CRP, ESR, eGFR, creatinine, and CBC (in 9/2023 at [facility]).      INTERVAL HISTORY:  Reports interval history as noted on the questionnaire below or scanned under media tab.  [facility] Rheumatology Established Patient History Form    9/18/2023  8:26 AM PDT - Filed by Patient   MAIN REASON FOR VISIT Follow up visit   INTERVAL HISTORY OF ILLNESS   Date of worsening onset: Sinus fluid building up in August.   Preceding incident/ailment:    Describe/list your symptoms: Thick mucus building up in the sinuses in August.  Cheek and gum soreness.   Exacerbating factors:    Alleviating factors: Flushing with Alkalol twice a day   Helpful medications: Alkalol and Bactrim   Ineffective medications:    Severity of pain (scale of 1-10): 6   Personal/emotional stressors:    Luke All The Areas Of Pain    REVIEW OF SYMPTOMS    General   Fevers    Chills    Night sweats    Malaise    Fatigue Yes   Unintentional weight loss    Musculoskeletal   Joint pain Worse with activity   Morning stiffness duration >1 hour   Morning stiffness characteristic Worse with activity   Joint swelling Yes   Joint instability    Tendon pain Yes   Muscle pain    Body aches    Dermatologic   Hair loss with bald spots    Hair shedding Yes   Skin thickening    Skin plaques    Sunlight-induced skin rash    Cold-induced color changes (white,  purple, red on rewarming)    Neurologic/Psychiatric   Weakness Yes   Spasms    Tingling Yes   Burning Yes   Numbness Yes   Insomnia Yes   Anxiety    Depression    Head/Eyes   Headaches    Temple pain    Dizziness    Dry eyes Yes   Eye pain    Eye redness Yes   Blurry vision    Vision loss    Ears/Nose   Ear pain    Ringing in ears    Vertigo Yes   Hearing loss    Nasal ulcers    Nosebleeds    Sinus pain Yes   Nasal congestion Yes   Snoring    Mouth/Throat   Oral ulcers Yes   Bleeding gums    Dry mouth    Cavities    Sore throat    Sticking in throat    Difficulty speaking    Neck/Lymphatics   Thyroid pain    Thyroid swelling    Lymph node swelling Neck   Cardiac/Respiratory   Chest pain with breathing    Dry cough    Cough with bloody phlegm    Shortness of breath    Fast heartbeats    Irregular heartbeats    Gastrointestinal   Nausea    Vomiting    Difficulty swallowing    Heartburn Yes   Abdominal pain    Bloody stool    Mucus stool    Genitourinary   Pelvic pain    Genital ulcers    Abnormal discharge    Burning urination    Frothy urine    Blood in urine        REVIEW OF SYSTEMS:  Except as noted in the history above, relevant review of systems with emphasis on autoimmune rheumatic diseases was otherwise negative.      ACTIVE PROBLEM LIST:  Patient Active Problem List    Diagnosis Date Noted    Primary osteoarthritis of both hands 09/25/2023    Chronic pansinusitis 03/18/2023    Immunosuppressed status (HCC) 03/18/2023    Peripheral sensory neuropathy 03/18/2023    Acute calculous cholecystitis 04/16/2022    Granulomatosis with polyangiitis of nose (HCC) 04/16/2022       PAST MEDICAL HISTORY:  History reviewed. No pertinent past medical history.    PAST SURGICAL HISTORY:  Past Surgical History:   Procedure Laterality Date    JERAMY BY LAPAROSCOPY N/A 4/16/2022    Procedure: CHOLECYSTECTOMY, LAPAROSCOPIC;  Surgeon: Demario Lal M.D.;  Location: SURGERY [facility];  Service: General        MEDICATIONS:  Current Outpatient Medications   Medication Sig    methotrexate 2.5 MG Tab Take 4 Tablets by mouth every 7 days.    folic acid (FOLVITE) 1 MG Tab Take 1 Tablet by mouth every day. Except the day of methotrexate.    cyclobenzaprine (FLEXERIL) 10 mg Tab TAKE ONE TABLET BY MOUTH EVERY EVENING AT BEDTIME    amitriptyline (ELAVIL) 10 MG Tab TAKE 1 TO 3 TABLETS BY MOUTH EVERY EVENING AT BEDTIME AS NEEDED    lidocaine-prilocaine (EMLA) 2.5-2.5 % Cream APPLY SPARINGLY TO AREA TOPICALLY PRIOR TO PROCEDURE    sulfamethoxazole-trimethoprim (BACTRIM DS) 800-160 MG tablet TAKE ONE TABLET BY MOUTH EVERY OTHER DAY    mupirocin (BACTROBAN) 2 % Ointment Apply 1 Application. topically 2 times a day.    acetaminophen (TYLENOL) 325 MG Tab Take 2 Tablets by mouth every four hours as needed.    zolpidem (AMBIEN) 10 MG Tab Take 10 mg by mouth at bedtime.    ibuprofen (MOTRIN) 200 MG Tab Take 800 mg by mouth 1 time a day as needed for Mild Pain.    promethazine (PHENERGAN) 25 MG Tab Take 25 mg by mouth 1 time a day as needed for Nausea/Vomiting. Pt takes with weekly methotrexate    atorvastatin (LIPITOR) 20 MG Tab Take 20 mg by mouth every day.    simvastatin (ZOCOR) 20 MG Tab Take 20 mg by mouth every evening. (Patient not taking: Reported on 9/25/2023)       ALLERGIES:   Allergies   Allergen Reactions    Penicillins Vomiting     N/V, fever   Reaction ~15 years ago       IMMUNIZATIONS:  There is no immunization history on file for this patient.    SOCIAL HISTORY:   Social History     Socioeconomic History    Marital status:    Tobacco Use    Smoking status: Every Day     Current packs/day: 0.50     Average packs/day: 0.5 packs/day for 30.0 years (15.0 ttl pk-yrs)     Types: Cigarettes    Smokeless tobacco: Never   Vaping Use    Vaping Use: Never used   Substance and Sexual Activity    Alcohol use: Yes     Comment: occ    Drug use: Never       FAMILY HISTORY:  History reviewed. No pertinent family history.      "    Objective     Vital Signs: /66 (BP Location: Left arm, Patient Position: Sitting, BP Cuff Size: Adult)   Pulse 85   Temp 36.3 °C (97.4 °F) (Temporal)   Ht 1.702 m (5' 7\")   Wt 89.8 kg (198 lb)   SpO2 97% Body mass index is 31.01 kg/m².    General: Appears well and comfortable  Eyes: No scleral or conjunctival lesions  ENT: Minimal hyperemia of bilateral nasal mucosa  Head/Neck: No apparent scalp or neck lesions  Cardiovascular: Regular rate and rhythm  Respiratory: Breathing quiet and unlabored  Gastrointestinal: No apparent organomegaly or abdominal masses  Integumentary: No significant cutaneous lesions or discolorations  Musculoskeletal: Poorly localized minimal tenderness of hands; no periarticular soft tissue swelling, warmth, erythema, or overt synovitis; no significant restriction in range of motion of joints examined  Neurologic: No focal sensory or motor deficits  Psychiatric: Mood and affect appropriate      LABORATORY RESULTS REVIEWED AND INTERPRETED BY ME:  Lab Results   Component Value Date/Time    WBC 14.5 (H) 04/16/2022 08:25 AM    RBC 4.91 04/16/2022 08:25 AM    HEMOGLOBIN 15.7 04/16/2022 08:25 AM    HEMATOCRIT 46.9 04/16/2022 08:25 AM    MCV 95.5 04/16/2022 08:25 AM    MCH 32.0 04/16/2022 08:25 AM    MCHC 33.5 (L) 04/16/2022 08:25 AM    RDW 49.1 04/16/2022 08:25 AM    PLATELETCT 294 04/16/2022 08:25 AM    MPV 9.2 04/16/2022 08:25 AM    NEUTS 10.04 (H) 04/16/2022 08:25 AM    LYMPHOCYTES 20.90 (L) 04/16/2022 08:25 AM    MONOCYTES 7.10 04/16/2022 08:25 AM    EOSINOPHILS 1.80 04/16/2022 08:25 AM    BASOPHILS 0.30 04/16/2022 08:25 AM     Lab Results   Component Value Date/Time    ASTSGOT 16 04/16/2022 08:25 AM    ALTSGPT 24 04/16/2022 08:25 AM    ALKPHOSPHAT 112 (H) 04/16/2022 08:25 AM    TBILIRUBIN 0.4 04/16/2022 08:25 AM    TOTPROTEIN 7.2 04/16/2022 08:25 AM    ALBUMIN 4.3 04/16/2022 08:25 AM     Lab Results   Component Value Date/Time    SODIUM 141 04/16/2022 08:25 AM    POTASSIUM 4.5 " 04/16/2022 08:25 AM    CHLORIDE 105 04/16/2022 08:25 AM    CO2 23 04/16/2022 08:25 AM    GLUCOSE 111 (H) 04/16/2022 08:25 AM    BUN 17 04/16/2022 08:25 AM    CREATININE 0.57 04/16/2022 08:25 AM    CALCIUM 9.6 04/16/2022 08:25 AM     Lab Results   Component Value Date/Time    COLORURINE Yellow 04/16/2022 10:04 AM    SPECGRAVITY 1.023 04/16/2022 10:04 AM    PHURINE 5.5 04/16/2022 10:04 AM    GLUCOSEUR Negative 04/16/2022 10:04 AM    KETONES Negative 04/16/2022 10:04 AM    PROTEINURIN Negative 04/16/2022 10:04 AM       RADIOLOGY RESULTS REVIEWED AND INTERPRETED BY ME:  No loadable results found in the system. Pertinent non-system results, if applicable, summarized under history above.      All relevant laboratory and imaging results reported on this note were reviewed and interpreted by me.         Assessment & Plan     Celine Li is a 58 y.o. female with history as noted above whose presentation merits the following diagnostic and clinical status impressions and recommendations:    1. Granulomatosis with polyangiitis of nose (HCC)  Clinical picture remains compatible with seronegative GPA with primarily nasal involvement that has responded well to medical and ENT surgical treatment. Presently with no clinical or serologic evidence of disease activity, so considering undetectable CD19+/CD20+ B-cells in 3/2023, reasonable to defer her next rituximab infusion until 12/2023.  - CRP and ESR (previously ordered)  - methotrexate 2.5 MG Tab; Take 4 Tablets by mouth every 7 days.  Dispense: 52 Tablet; Refill: 3  - Rituxan next infusion moved from 8/7/23 to 12/7/23 (infusion order to be faxed to [facility] around 12/1/23)    2. Chronic pansinusitis  Predominant manifestation of her GPA which sometimes has superimposed infection that is managed with long-term oral and topical antimicrobial agents.  - CRP and ESR (previously ordered)  - Mupirocin ointment twice daily as needed  - Bactrim DS 3 times  weekly  - Plan for nasal swab culture after the next round of rituximab infusion, and if negative, then consider discontinuation of Bactrim    3. Primary osteoarthritis of both hands  Presumably the etiology of hand joint pain which can be managed supportively.  - Voltaren 1% gel and Tylenol Arthritis with or without oral NSAIDs as needed  - Trial of paraffin wax hand therapy  - Consider intra-articular steroid injection if that becomes necessary    4. Immunosuppressed status (HCC)  Presently with no reported history, physical exam, or laboratory evidence to suggest significant adverse drug effects or opportunistic infections, but need routine monitoring per guidelines.  - CBC and CMP (previously ordered)  - Need to ascertain age-appropriate vaccines apart from the ones noted above under immunizations  - folic acid (FOLVITE) 1 MG Tab; Take 1 Tablet by mouth every day. Except the day of methotrexate.  Dispense: 90 Tablet; Refill: 3      The above assessment and plan were discussed with the patient who acknowledged understanding of the plan.    FOLLOW-UP: Return in about 3 months (around 12/25/2023) for Short.         Thank you for the opportunity to participate in the care of Celine Li.    Arturo Marc MD, MS  Rheumatologist, [facility] ? [facility]   of Clinical Medicine, Department of Internal Medicine  [facility] ? [facility]

## 2023-09-25 NOTE — PATIENT INSTRUCTIONS

## 2023-11-13 ENCOUNTER — PATIENT MESSAGE (OUTPATIENT)
Dept: RHEUMATOLOGY | Facility: MEDICAL CENTER | Age: 58
End: 2023-11-13
Payer: COMMERCIAL

## 2023-12-23 ASSESSMENT — RHEUMATOLOGY FOLLOW-UP QUESTIONNAIRE
EAR PAIN: Y
DRY MOUTH: Y
CHARACTERISTIC: SAME WITH ACTIVITY
JOINT SWELLING: Y
SUNLIGHT-INDUCED SKIN RASH: ARMS
BODY ACHES: Y
JOINT PAIN: SAME WITH ACTIVITY
SUNLIGHT-INDUCED SKIN RASH: CHEST
HELPFUL_MEDICATIONS: BACTRIM
HAIR SHEDDING: Y
VERTIGO: Y
NUMBNESS: Y
RATE_1TO10: 5
NASAL CONGESTION: Y
INSOMNIA: Y
MARK ALL THE AREAS OF PAIN: 94922276
DURATION: >1 HOUR
SUNLIGHT-INDUCED SKIN RASH: NECK
BURNING: Y
SINUS PAIN: Y
SUNLIGHT-INDUCED SKIN RASH: FACE
TINGLING: Y

## 2023-12-26 ENCOUNTER — HOSPITAL ENCOUNTER (OUTPATIENT)
Dept: LAB | Facility: MEDICAL CENTER | Age: 58
End: 2023-12-26
Attending: STUDENT IN AN ORGANIZED HEALTH CARE EDUCATION/TRAINING PROGRAM
Payer: COMMERCIAL

## 2023-12-26 ENCOUNTER — OFFICE VISIT (OUTPATIENT)
Dept: RHEUMATOLOGY | Facility: MEDICAL CENTER | Age: 58
End: 2023-12-26
Attending: STUDENT IN AN ORGANIZED HEALTH CARE EDUCATION/TRAINING PROGRAM
Payer: COMMERCIAL

## 2023-12-26 VITALS
TEMPERATURE: 98.9 F | WEIGHT: 196 LBS | HEIGHT: 67 IN | BODY MASS INDEX: 30.76 KG/M2 | OXYGEN SATURATION: 98 % | DIASTOLIC BLOOD PRESSURE: 66 MMHG | SYSTOLIC BLOOD PRESSURE: 126 MMHG | HEART RATE: 91 BPM

## 2023-12-26 DIAGNOSIS — D84.9 IMMUNOSUPPRESSED STATUS (HCC): ICD-10-CM

## 2023-12-26 DIAGNOSIS — M31.30 GRANULOMATOSIS WITH POLYANGIITIS OF NOSE (HCC): ICD-10-CM

## 2023-12-26 DIAGNOSIS — M19.041 PRIMARY OSTEOARTHRITIS OF BOTH HANDS: ICD-10-CM

## 2023-12-26 DIAGNOSIS — M19.042 PRIMARY OSTEOARTHRITIS OF BOTH HANDS: ICD-10-CM

## 2023-12-26 DIAGNOSIS — J32.4 CHRONIC PANSINUSITIS: ICD-10-CM

## 2023-12-26 PROCEDURE — 99214 OFFICE O/P EST MOD 30 MIN: CPT | Performed by: STUDENT IN AN ORGANIZED HEALTH CARE EDUCATION/TRAINING PROGRAM

## 2023-12-26 PROCEDURE — 3074F SYST BP LT 130 MM HG: CPT | Performed by: STUDENT IN AN ORGANIZED HEALTH CARE EDUCATION/TRAINING PROGRAM

## 2023-12-26 PROCEDURE — 99212 OFFICE O/P EST SF 10 MIN: CPT | Performed by: STUDENT IN AN ORGANIZED HEALTH CARE EDUCATION/TRAINING PROGRAM

## 2023-12-26 PROCEDURE — 84433 ASY THIOPURIN S-MTHYLTRNSFRS: CPT

## 2023-12-26 PROCEDURE — 36415 COLL VENOUS BLD VENIPUNCTURE: CPT

## 2023-12-26 PROCEDURE — 3078F DIAST BP <80 MM HG: CPT | Performed by: STUDENT IN AN ORGANIZED HEALTH CARE EDUCATION/TRAINING PROGRAM

## 2023-12-26 RX ORDER — AZATHIOPRINE 50 MG/1
50 TABLET ORAL DAILY
Qty: 30 TABLET | Refills: 5 | Status: SHIPPED | OUTPATIENT
Start: 2023-12-26

## 2023-12-26 ASSESSMENT — FIBROSIS 4 INDEX: FIB4 SCORE: 0.66

## 2023-12-26 NOTE — PROGRESS NOTES
[facility] RHEUMATOLOGY  [address], [address], [place] [address]  Phone: [phone] ? Fax: [phone]  [facility]/Health-Services/Rheumatology    RHEUMATOLOGY FOLLOW-UP VISIT NOTE      DATE OF SERVICE: 12/26/2023         Subjective     PRIMARY CARE PRACTITIONER:  Biju Rivera M.D.  [address]  [place] [id number]    PATIENT IDENTIFICATION:  Celine Li  [id number] [place] [address]    YOB: 1965    MEDICAL RECORD NUMBER: [id number]          CHIEF COMPLAINT:   Chief Complaint   Patient presents with    Follow-Up     Granulomatosis with polyangiitis of nose (HCC)       RHEUMATOLOGIC HISTORY:  Celine Li is a 58 y.o. female with pertinent history notable for granulomatosis with polyangiitis of nose diagnosed in 11/2020 (based on nasal tissue biopsy at 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 Previously under the care of [facility] in [place], she initially presented on 3/17/23 to establish care for continued evaluation and management of her GPA. Reported onset of GPA symptoms in 3/2020 with worsening of her chronic pansinusitis leading to nasal septum damage with bloody discharge, fluid-filled sinuses, facial pressure, and ear pressure with vertigo all of which led to her eventual diagnosis of GPA. Reported that though these had improved with treatment since diagnosis, they had been waxing/waning over time with variable degrees of mild to moderate symptoms. Additionally reported history of photosensitive rash (on face, neck, chest, and arms),  cold-induced color changes of fingers, and tingling/numbness of feet.    Pertinent treatments: Prednisone high-to-low dose (effective but caused suicidal ideation), Bactrim DS 3 times weekly (3/2022-present), Rituxan 500 mg IV every 6 months (1/2021-present, last dose 12/2023), methotrexate 15>10 mg oral weekly with folic acid 1 mg daily (started 1/2021, presumably caused elevated liver enzymes, dose decreased 2/2023-present, effective), azathioprine 50 mg oral daily (ordered 12/26/23-present).    Pertinent positive labs: CD19+/CD20+ B-cells undetectable (in 3/2023); elevated <122 with normal AST and ALT (in 12/2023<9/2023).    Pertinent negative labs: Negative SHAN, ANCA, anti-MPO, anti-PR3, C3, C4, and CH50 (in 3/2023), CRP, ESR, eGFR, creatinine, and CBC (in 12/2023 at [facility]).      INTERVAL HISTORY:  Reports interval history as noted on the questionnaire below or scanned under media tab.  [facility] Established Patient History Form    12/23/2023  5:33 AM PST - Filed by Patient   MAIN REASON FOR VISIT Follow up on GPA   INTERVAL HISTORY OF ILLNESS   Date of worsening onset: 11/2023   Preceding incident/ailment:    Describe/list your symptoms: mucus thickening, facial pain, congestion   Exacerbating factors:    Alleviating factors: nasal flushing   Helpful medications: bactrim   Ineffective medications:    Severity of pain (scale of 1-10): 5   Personal/emotional stressors:    Luke All The Areas Of Pain    REVIEW OF SYMPTOMS    General   Fevers    Chills    Night sweats    Malaise    Fatigue    Unintentional weight loss    Musculoskeletal   Joint pain Same with activity   Morning stiffness duration >1 hour   Morning stiffness characteristic Same with activity   Joint swelling Yes   Joint instability    Tendon pain    Muscle pain    Body aches Yes   Dermatologic   Hair loss with bald spots    Hair shedding Yes   Skin thickening    Skin plaques    Sunlight-induced skin rash Face;  Neck;  Chest;  Arms    Cold-induced color changes (white, purple, red on rewarming)    Neurologic/Psychiatric   Weakness    Spasms    Tingling Yes   Burning Yes   Numbness Yes   Insomnia Yes   Anxiety    Depression    Head/Eyes   Headaches    Temple pain    Dizziness    Dry eyes    Eye pain    Eye redness    Blurry vision    Vision loss    Ears/Nose   Ear pain Yes   Ringing in ears    Vertigo Yes   Hearing loss    Nasal ulcers    Nosebleeds    Sinus pain Yes   Nasal congestion Yes   Snoring    Mouth/Throat   Oral ulcers    Bleeding gums    Dry mouth Yes   Cavities    Sore throat    Sticking in throat    Difficulty speaking    Neck/Lymphatics   Thyroid pain    Thyroid swelling    Lymph node swelling    Cardiac/Respiratory   Chest pain with breathing    Dry cough    Cough with bloody phlegm    Shortness of breath    Fast heartbeats    Irregular heartbeats    Gastrointestinal   Nausea    Vomiting    Difficulty swallowing    Heartburn    Abdominal pain    Bloody stool    Mucus stool    Genitourinary   Pelvic pain    Genital ulcers    Abnormal discharge    Burning urination    Frothy urine    Blood in urine      REVIEW OF SYSTEMS:  Except as noted in the history above, relevant review of systems with emphasis on autoimmune rheumatic diseases was otherwise negative.      ACTIVE PROBLEM LIST:  Patient Active Problem List    Diagnosis Date Noted    Primary osteoarthritis of both hands 09/25/2023    Chronic pansinusitis 03/18/2023    Immunosuppressed status (HCC) 03/18/2023    Peripheral sensory neuropathy 03/18/2023    Acute calculous cholecystitis 04/16/2022    Granulomatosis with polyangiitis of nose (HCC) 04/16/2022       PAST MEDICAL HISTORY:  History reviewed. No pertinent past medical history.    PAST SURGICAL HISTORY:  Past Surgical History:   Procedure Laterality Date    JERAMY BY LAPAROSCOPY N/A 4/16/2022    Procedure: CHOLECYSTECTOMY, LAPAROSCOPIC;  Surgeon: Demario Lal M.D.;  Location: SURGERY [facility];  Service: General  "      MEDICATIONS:  Current Outpatient Medications   Medication Sig    azaTHIOprine (IMURAN) 50 MG Tab Take 1 Tablet by mouth every day.    atorvastatin (LIPITOR) 20 MG Tab Take 20 mg by mouth every day.    methotrexate 2.5 MG Tab Take 4 Tablets by mouth every 7 days.    folic acid (FOLVITE) 1 MG Tab Take 1 Tablet by mouth every day. Except the day of methotrexate.    cyclobenzaprine (FLEXERIL) 10 mg Tab TAKE ONE TABLET BY MOUTH EVERY EVENING AT BEDTIME    amitriptyline (ELAVIL) 10 MG Tab TAKE 1 TO 3 TABLETS BY MOUTH EVERY EVENING AT BEDTIME AS NEEDED    lidocaine-prilocaine (EMLA) 2.5-2.5 % Cream APPLY SPARINGLY TO AREA TOPICALLY PRIOR TO PROCEDURE    sulfamethoxazole-trimethoprim (BACTRIM DS) 800-160 MG tablet TAKE ONE TABLET BY MOUTH EVERY OTHER DAY    mupirocin (BACTROBAN) 2 % Ointment Apply 1 Application. topically 2 times a day.    zolpidem (AMBIEN) 10 MG Tab Take 10 mg by mouth at bedtime.    promethazine (PHENERGAN) 25 MG Tab Take 25 mg by mouth 1 time a day as needed for Nausea/Vomiting. Pt takes with weekly methotrexate       ALLERGIES:   Allergies   Allergen Reactions    Penicillins Vomiting     N/V, fever   Reaction ~15 years ago       IMMUNIZATIONS:  There is no immunization history on file for this patient.    SOCIAL HISTORY:   Social History     Socioeconomic History    Marital status:    Tobacco Use    Smoking status: Every Day     Current packs/day: 0.50     Average packs/day: 0.5 packs/day for 30.0 years (15.0 ttl pk-yrs)     Types: Cigarettes    Smokeless tobacco: Never   Vaping Use    Vaping Use: Never used   Substance and Sexual Activity    Alcohol use: Yes     Comment: occ    Drug use: Never       FAMILY HISTORY:  History reviewed. No pertinent family history.         Objective     Vital Signs: /66 (BP Location: Right arm, Patient Position: Sitting, BP Cuff Size: Large adult)   Pulse 91   Temp 37.2 °C (98.9 °F) (Temporal)   Ht 1.702 m (5' 7\")   Wt 88.9 kg (196 lb)   SpO2 98% " Body mass index is 30.7 kg/m².    General: Appears well and comfortable  Eyes: No scleral or conjunctival lesions  ENT: Trace hyperemia of bilateral nasal mucosa (much improved from previously)  Head/Neck: No apparent scalp or neck lesions  Cardiovascular: Regular rate and rhythm  Respiratory: Breathing quiet and unlabored  Gastrointestinal: No apparent organomegaly or abdominal masses  Integumentary: No significant cutaneous lesions or discolorations  Musculoskeletal: Poorly localized minimal tenderness of hands; no periarticular soft tissue swelling, warmth, erythema, or overt synovitis; no significant restriction in range of motion of joints examined  Neurologic: No focal sensory or motor deficits  Psychiatric: Mood and affect appropriate      LABORATORY RESULTS REVIEWED AND INTERPRETED BY ME:  Lab Results   Component Value Date/Time    WBC 14.5 (H) 04/16/2022 08:25 AM    RBC 4.91 04/16/2022 08:25 AM    HEMOGLOBIN 15.7 04/16/2022 08:25 AM    HEMATOCRIT 46.9 04/16/2022 08:25 AM    MCV 95.5 04/16/2022 08:25 AM    MCH 32.0 04/16/2022 08:25 AM    MCHC 33.5 (L) 04/16/2022 08:25 AM    RDW 49.1 04/16/2022 08:25 AM    PLATELETCT 294 04/16/2022 08:25 AM    MPV 9.2 04/16/2022 08:25 AM    NEUTS 10.04 (H) 04/16/2022 08:25 AM    LYMPHOCYTES 20.90 (L) 04/16/2022 08:25 AM    MONOCYTES 7.10 04/16/2022 08:25 AM    EOSINOPHILS 1.80 04/16/2022 08:25 AM    BASOPHILS 0.30 04/16/2022 08:25 AM     Lab Results   Component Value Date/Time    ASTSGOT 16 04/16/2022 08:25 AM    ALTSGPT 24 04/16/2022 08:25 AM    ALKPHOSPHAT 112 (H) 04/16/2022 08:25 AM    TBILIRUBIN 0.4 04/16/2022 08:25 AM    TOTPROTEIN 7.2 04/16/2022 08:25 AM    ALBUMIN 4.3 04/16/2022 08:25 AM     Lab Results   Component Value Date/Time    SODIUM 141 04/16/2022 08:25 AM    POTASSIUM 4.5 04/16/2022 08:25 AM    CHLORIDE 105 04/16/2022 08:25 AM    CO2 23 04/16/2022 08:25 AM    GLUCOSE 111 (H) 04/16/2022 08:25 AM    BUN 17 04/16/2022 08:25 AM    CREATININE 0.57 04/16/2022 08:25  AM    CALCIUM 9.6 04/16/2022 08:25 AM     Lab Results   Component Value Date/Time    COLORURINE Yellow 04/16/2022 10:04 AM    SPECGRAVITY 1.023 04/16/2022 10:04 AM    PHURINE 5.5 04/16/2022 10:04 AM    GLUCOSEUR Negative 04/16/2022 10:04 AM    KETONES Negative 04/16/2022 10:04 AM    PROTEINURIN Negative 04/16/2022 10:04 AM       RADIOLOGY RESULTS REVIEWED AND INTERPRETED BY ME:  No loadable results found in the system. Pertinent non-system results, if applicable, summarized under history above.      All relevant laboratory and imaging results reported on this note were reviewed and interpreted by me.         Assessment & Plan     Celine Li is a 58 y.o. female with history as noted above whose presentation merits the following diagnostic and clinical status impressions and recommendations:    1. Granulomatosis with polyangiitis of nose (HCC)  Clinical picture remains compatible with seronegative GPA with primarily nasal involvement that has responded well to medical and ENT surgical treatment in addition to ongoing immunosuppressive therapy. However, given her chronic residual sinonasal symptoms, reasonable to optimize her treatment by switching from methotrexate to azathioprine after confirming normal TPMT level.  - CRP and ESR (previously ordered)  - THIOPURINE METHYLTRANSFERASE, RBC; Future  - Continue Rituxan 500 mg IV every 6 months (last infusion on 12/7/23 and next in 6/2024 TBD depending on her CD19+/CD20+ B-cell count)  - azaTHIOprine (IMURAN) 50 MG Tab; Take 1 Tablet by mouth every day.  Dispense: 30 Tablet; Refill: 5  - Discontinue methotrexate 10 mg oral weekly with folic acid 1 mg daily after finishing the current supply    2. Chronic pansinusitis  Predominant manifestation of her GPA which sometimes has superimposed infection that is managed with long-term oral and topical antimicrobial agents.  - CRP and ESR (previously ordered)  - Bactrim DS 3 times weekly  - Mupirocin ointment twice daily  as needed  - Plan for nasal swab culture after the next round of rituximab infusion, and if negative, then consider discontinuation of Bactrim    3. Primary osteoarthritis of both hands  Presumably the etiology of her hand joint pain which can be managed supportively.  - CBC and CMP (previously ordered)  - Voltaren 1% gel and Tylenol Arthritis with or without oral NSAIDs as needed  - Trial of paraffin wax hand therapy  - Consider intra-articular steroid injection if that becomes necessary    4. Immunosuppressed status (HCC)  Presently with no reported history or physical exam evidence to suggest significant adverse drug effects or opportunistic infections, but need routine monitoring per guidelines, especially in the setting of her elevated ALP.  - CBC and CMP (previously ordered)  - THIOPURINE METHYLTRANSFERASE, RBC; Future  - Need to ascertain age-appropriate vaccines apart from the ones noted above under immunizations      The above assessment and plan were discussed with the patient who acknowledged understanding of the plan.    FOLLOW-UP: Return in about 2 months (around 2/26/2024) for Short.         Thank you for the opportunity to participate in the care of Celine Li.    Arturo Marc MD, MS, FACR  Rheumatologist, [facility] Rheumatology ? [facility]   of Clinical Medicine, Department of Internal Medicine  [facility] ? [facility] [employer] [facility]

## 2023-12-26 NOTE — PATIENT INSTRUCTIONS
AFTER VISIT INSTRUCTIONS    Below are important information to help you navigate your healthcare needs and help us serve you safely and effectively:  If laboratory tests and/or imaging studies were ordered, remember to go get them done as instructed.  If new prescriptions and/or refills were sent, remember to go pick them up from your local pharmacy, or call the specialty pharmacy to request shipment.  Always take your prescription medications exactly as prescribed unless instructed otherwise.  Note that antirheumatic drugs and steroids are immunosuppressive which means they increase your risk of infections and have multiple potential adverse effects on various organ systems in your body, though most of them are uncommon.  It is important that you are up-to-date on age-appropriate immunizations, particularly shingles and bacterial/viral pneumonia vaccines, which you can request from me or your primary care provider.  Be sure to read the drug package inserts to learn about the potential side effects of your medications before you start taking them.  If you experience any significant drug side effects, stop taking the medication and notify me promptly, and depending on the severity of the side effects, consider going to an urgent care or emergency department for immediate attention.  If there are significant findings on your lab tests and imaging studies that warrant further action, I will notify you with explanations via [employer] or phone call, otherwise you can view them on [employer] and let me know if you have any questions.  Note that [employer] messages are typically read during office hours and may take 1-7 business days before a response depending on the urgency of the situation and how busy my clinic schedule is.  In general, [employer] messaging is for non-urgent matters that do not require immediate attention, so for urgent matters that cannot wait, you are advised to go to an urgent care.  Lastly, you are granted  [employer] access to my documentation of your visit and are encouraged to read my note which details my assessment and plan for your condition.  To learn more about your condition and rheumatic diseases evaluated and treated by rheumatologists, as well as gain access to many helpful resources about these diseases, visit our website at [email or web address].

## 2023-12-31 LAB — TPMT BLD-CCNC: 28.9 U/ML (ref 24–44)

## 2024-02-22 ASSESSMENT — RHEUMATOLOGY FOLLOW-UP QUESTIONNAIRE
CHARACTERISTIC: SAME WITH ACTIVITY
HELPFUL_MEDICATIONS: IMURAN
RINGING IN EARS: Y
MARK ALL THE AREAS OF PAIN: 97511645
PRECEDING INCIDENT OR AILMENT: SAME
NASAL CONGESTION: Y
JOINT PAIN: SAME WITH ACTIVITY
HAIR SHEDDING: Y
DURATION: >1 HOUR
JOINT SWELLING: Y
UNINTENTIONAL WEIGHT LOSS: <10 LBS
EAR PAIN: Y
ORAL ULCERS: Y
DRY MOUTH: Y
CHIEF_COMPLAINT: FOLLOW UP
SINUS PAIN: Y
RATE_1TO10: 3

## 2024-02-26 ENCOUNTER — OFFICE VISIT (OUTPATIENT)
Dept: RHEUMATOLOGY | Facility: MEDICAL CENTER | Age: 59
End: 2024-02-26
Attending: STUDENT IN AN ORGANIZED HEALTH CARE EDUCATION/TRAINING PROGRAM
Payer: COMMERCIAL

## 2024-02-26 ENCOUNTER — HOSPITAL ENCOUNTER (OUTPATIENT)
Dept: LAB | Facility: MEDICAL CENTER | Age: 59
End: 2024-02-26
Attending: STUDENT IN AN ORGANIZED HEALTH CARE EDUCATION/TRAINING PROGRAM
Payer: COMMERCIAL

## 2024-02-26 VITALS
BODY MASS INDEX: 29.66 KG/M2 | SYSTOLIC BLOOD PRESSURE: 122 MMHG | HEART RATE: 92 BPM | HEIGHT: 67 IN | TEMPERATURE: 97.9 F | OXYGEN SATURATION: 98 % | DIASTOLIC BLOOD PRESSURE: 68 MMHG | WEIGHT: 189 LBS

## 2024-02-26 DIAGNOSIS — M19.042 PRIMARY OSTEOARTHRITIS OF BOTH HANDS: ICD-10-CM

## 2024-02-26 DIAGNOSIS — J32.4 CHRONIC PANSINUSITIS: ICD-10-CM

## 2024-02-26 DIAGNOSIS — M19.041 PRIMARY OSTEOARTHRITIS OF BOTH HANDS: ICD-10-CM

## 2024-02-26 DIAGNOSIS — M31.30 GRANULOMATOSIS WITH POLYANGIITIS OF NOSE (HCC): ICD-10-CM

## 2024-02-26 DIAGNOSIS — D84.9 IMMUNOSUPPRESSED STATUS (HCC): ICD-10-CM

## 2024-02-26 PROCEDURE — 99214 OFFICE O/P EST MOD 30 MIN: CPT | Performed by: STUDENT IN AN ORGANIZED HEALTH CARE EDUCATION/TRAINING PROGRAM

## 2024-02-26 PROCEDURE — 3074F SYST BP LT 130 MM HG: CPT | Performed by: STUDENT IN AN ORGANIZED HEALTH CARE EDUCATION/TRAINING PROGRAM

## 2024-02-26 PROCEDURE — 99212 OFFICE O/P EST SF 10 MIN: CPT | Performed by: STUDENT IN AN ORGANIZED HEALTH CARE EDUCATION/TRAINING PROGRAM

## 2024-02-26 PROCEDURE — 3078F DIAST BP <80 MM HG: CPT | Performed by: STUDENT IN AN ORGANIZED HEALTH CARE EDUCATION/TRAINING PROGRAM

## 2024-02-26 ASSESSMENT — FIBROSIS 4 INDEX: FIB4 SCORE: 0.66

## 2024-02-26 ASSESSMENT — PATIENT HEALTH QUESTIONNAIRE - PHQ9: CLINICAL INTERPRETATION OF PHQ2 SCORE: 0

## 2024-02-26 NOTE — PROGRESS NOTES
[facility] RHEUMATOLOGY  [address], [address], [place] [address]  Phone: [phone] ? Fax: [phone]  [facility]/Health-Services/Rheumatology    FOLLOW-UP VISIT NOTE      DATE OF SERVICE: 02/26/2024         Subjective     PRIMARY CARE PRACTITIONER:  Biju Rivera M.D.  [address]  [place] [address]    PATIENT IDENTIFICATION:  Celine Li  [address] [place] [address]    YOB: 1965    MEDICAL RECORD NUMBER: [id number]          CHIEF COMPLAINT:   Chief Complaint   Patient presents with    Follow-Up     Granulomatosis with polyangiitis of nose (HCC)       RHEUMATOLOGIC HISTORY:  Celine Li is a 58 y.o. 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 Previously under the care of [facility] in [place], she initially presented on 3/17/23 to establish care for continued evaluation and management of her GPA. Reported onset of GPA symptoms in 3/2020 with worsening of her chronic pansinusitis leading to nasal septum damage with bloody discharge, fluid-filled sinuses, facial pressure, and ear pressure with vertigo all of which led to her eventual diagnosis of GPA. Reported that though these had improved with treatment since diagnosis, they had been waxing/waning over time with variable degrees of mild to moderate symptoms. Additionally reported history of photosensitive rash (on face, neck, chest, and arms), cold-induced  color changes of fingers, and tingling/numbness of feet.    Pertinent treatments: Prednisone high-to-low dose (effective but caused suicidal ideation), mupirocin 2% ointment PRN (3/2022-present), Bactrim DS 3 times weekly (3/2022-present), methotrexate 15>10 mg oral weekly with folic acid 1 mg daily (started 1/2021, presumably caused elevated liver enzymes, dose decreased 2/2023-12/2023, became less effective), Rituxan 500 mg IV every 6 months (1/2021-present, last dose 12/2023), azathioprine 50 mg oral daily (ordered 12/26/23-present, effective).    Pertinent positive labs: Undetectable CD19+/CD20+ B-cells (in 3/2023).    Pertinent negative labs: Negative SHAN, ANCA, anti-MPO, anti-PR3, C3, C4, and CH50 (in 3/2023), TPMT 28.9 (in 12/2023), CRP, ESR, eGFR, creatinine, LFTs, and CBC (in 2/2024 from 12/2023 at [facility]).      INTERVAL HISTORY:  Reports interval history as noted on the questionnaire below.  [facility] Established Patient History Form    2/22/2024  7:01 AM PST - Filed by Patient   MAIN REASON FOR VISIT follow up   INTERVAL HISTORY OF ILLNESS   Date of worsening onset: no changes   Preceding incident/ailment: same   Describe/list your symptoms: less nasal mucus but somewhat thicker.  Trying to ween myself off the Bactrim from 3 times a week to two a week.   Exacerbating factors:    Alleviating factors:    Helpful medications: Imuran   Ineffective medications:    Severity of pain (scale of 1-10): 3   Personal/emotional stressors:    Luke All The Areas Of Pain    REVIEW OF SYMPTOMS    General   Fevers    Chills    Night sweats    Malaise    Fatigue    Unintentional weight loss <10 lbs   Musculoskeletal   Joint pain Same with activity   Morning stiffness duration >1 hour   Morning stiffness characteristic Same with activity   Joint swelling Yes   Joint instability    Tendon pain    Muscle pain    Body aches    Dermatologic   Hair loss with bald spots    Hair shedding Yes   Skin thickening    Skin plaques     Sunlight-induced skin rash    Cold-induced color changes (white, purple, red on rewarming)    Neurologic/Psychiatric   Weakness    Spasms    Tingling    Burning    Numbness    Insomnia    Anxiety    Depression    Head/Eyes   Headaches    Temple pain    Dizziness    Dry eyes    Eye pain    Eye redness    Blurry vision    Vision loss    Ears/Nose   Ear pain Yes   Ringing in ears Yes   Vertigo    Hearing loss    Nasal ulcers    Nosebleeds    Sinus pain Yes   Nasal congestion Yes   Snoring    Mouth/Throat   Oral ulcers Yes   Bleeding gums    Dry mouth Yes   Cavities    Sore throat    Sticking in throat    Difficulty speaking    Neck/Lymphatics   Thyroid pain    Thyroid swelling    Lymph node swelling    Cardiac/Respiratory   Chest pain with breathing    Dry cough    Cough with bloody phlegm    Shortness of breath    Fast heartbeats    Irregular heartbeats    Gastrointestinal   Nausea    Vomiting    Difficulty swallowing    Heartburn    Abdominal pain    Bloody stool    Mucus stool    Genitourinary   Pelvic pain    Genital ulcers    Abnormal discharge    Burning urination    Frothy urine    Blood in urine        REVIEW OF SYSTEMS:  Except as noted in the history above, relevant review of systems with emphasis on autoimmune rheumatic diseases was otherwise negative.      ACTIVE PROBLEM LIST:  Patient Active Problem List    Diagnosis Date Noted    Primary osteoarthritis of both hands 09/25/2023    Chronic pansinusitis 03/18/2023    Immunosuppressed status (HCC) 03/18/2023    Peripheral sensory neuropathy 03/18/2023    Acute calculous cholecystitis 04/16/2022    Granulomatosis with polyangiitis of nose (HCC) 04/16/2022       PAST MEDICAL HISTORY:  History reviewed. No pertinent past medical history.    PAST SURGICAL HISTORY:  Past Surgical History:   Procedure Laterality Date    JERAMY BY LAPAROSCOPY N/A 4/16/2022    Procedure: CHOLECYSTECTOMY, LAPAROSCOPIC;  Surgeon: Demario Lal M.D.;  Location: SURGERY [facility];  " Service: General       MEDICATIONS:  Current Outpatient Medications   Medication Sig    azaTHIOprine (IMURAN) 50 MG Tab Take 1 Tablet by mouth every day.    atorvastatin (LIPITOR) 20 MG Tab Take 20 mg by mouth every day.    methotrexate 2.5 MG Tab Take 4 Tablets by mouth every 7 days.    folic acid (FOLVITE) 1 MG Tab Take 1 Tablet by mouth every day. Except the day of methotrexate.    cyclobenzaprine (FLEXERIL) 10 mg Tab TAKE ONE TABLET BY MOUTH EVERY EVENING AT BEDTIME    amitriptyline (ELAVIL) 10 MG Tab TAKE 1 TO 3 TABLETS BY MOUTH EVERY EVENING AT BEDTIME AS NEEDED    lidocaine-prilocaine (EMLA) 2.5-2.5 % Cream APPLY SPARINGLY TO AREA TOPICALLY PRIOR TO PROCEDURE    sulfamethoxazole-trimethoprim (BACTRIM DS) 800-160 MG tablet TAKE ONE TABLET BY MOUTH EVERY OTHER DAY    mupirocin (BACTROBAN) 2 % Ointment Apply 1 Application. topically 2 times a day.    zolpidem (AMBIEN) 10 MG Tab Take 10 mg by mouth at bedtime.    promethazine (PHENERGAN) 25 MG Tab Take 25 mg by mouth 1 time a day as needed for Nausea/Vomiting. Pt takes with weekly methotrexate       ALLERGIES:   Allergies   Allergen Reactions    Penicillins Vomiting     N/V, fever   Reaction ~15 years ago       IMMUNIZATIONS:  There is no immunization history on file for this patient.    SOCIAL HISTORY:   Social History     Socioeconomic History    Marital status:    Tobacco Use    Smoking status: Every Day     Current packs/day: 0.50     Average packs/day: 0.5 packs/day for 30.0 years (15.0 ttl pk-yrs)     Types: Cigarettes    Smokeless tobacco: Never   Vaping Use    Vaping Use: Never used   Substance and Sexual Activity    Alcohol use: Yes     Comment: occ    Drug use: Never       FAMILY HISTORY:  History reviewed. No pertinent family history.         Objective     Vital Signs: /68 (BP Location: Left arm, Patient Position: Sitting, BP Cuff Size: Adult)   Pulse 92   Temp 36.6 °C (97.9 °F) (Temporal)   Ht 1.702 m (5' 7\")   Wt 85.7 kg (189 lb)   " SpO2 98% Body mass index is 29.6 kg/m².    General: Appears well and comfortable  Eyes: No scleral or conjunctival lesions  ENT: Trace hyperemia of bilateral nasal mucosa (much improved from previously)  Head/Neck: No apparent scalp or neck lesions  Cardiovascular: Regular rate and rhythm  Respiratory: Breathing quiet and unlabored  Gastrointestinal: No apparent organomegaly or abdominal masses  Integumentary: No significant cutaneous lesions or discolorations  Musculoskeletal: Poorly localized minimal tenderness of hands; no periarticular soft tissue swelling, warmth, erythema, or overt synovitis; no significant restriction in range of motion of joints examined  Neurologic: No focal sensory or motor deficits  Psychiatric: Mood and affect appropriate      LABORATORY RESULTS REVIEWED AND INTERPRETED BY ME:  Lab Results   Component Value Date/Time    WBC 14.5 (H) 04/16/2022 08:25 AM    RBC 4.91 04/16/2022 08:25 AM    HEMOGLOBIN 15.7 04/16/2022 08:25 AM    HEMATOCRIT 46.9 04/16/2022 08:25 AM    MCV 95.5 04/16/2022 08:25 AM    MCH 32.0 04/16/2022 08:25 AM    MCHC 33.5 (L) 04/16/2022 08:25 AM    RDW 49.1 04/16/2022 08:25 AM    PLATELETCT 294 04/16/2022 08:25 AM    MPV 9.2 04/16/2022 08:25 AM    NEUTS 10.04 (H) 04/16/2022 08:25 AM    LYMPHOCYTES 20.90 (L) 04/16/2022 08:25 AM    MONOCYTES 7.10 04/16/2022 08:25 AM    EOSINOPHILS 1.80 04/16/2022 08:25 AM    BASOPHILS 0.30 04/16/2022 08:25 AM     Lab Results   Component Value Date/Time    ASTSGOT 16 04/16/2022 08:25 AM    ALTSGPT 24 04/16/2022 08:25 AM    ALKPHOSPHAT 112 (H) 04/16/2022 08:25 AM    TBILIRUBIN 0.4 04/16/2022 08:25 AM    TOTPROTEIN 7.2 04/16/2022 08:25 AM    ALBUMIN 4.3 04/16/2022 08:25 AM     Lab Results   Component Value Date/Time    SODIUM 141 04/16/2022 08:25 AM    POTASSIUM 4.5 04/16/2022 08:25 AM    CHLORIDE 105 04/16/2022 08:25 AM    CO2 23 04/16/2022 08:25 AM    GLUCOSE 111 (H) 04/16/2022 08:25 AM    BUN 17 04/16/2022 08:25 AM    CREATININE 0.57  04/16/2022 08:25 AM    CALCIUM 9.6 04/16/2022 08:25 AM     Lab Results   Component Value Date/Time    COLORURINE Yellow 04/16/2022 10:04 AM    SPECGRAVITY 1.023 04/16/2022 10:04 AM    PHURINE 5.5 04/16/2022 10:04 AM    GLUCOSEUR Negative 04/16/2022 10:04 AM    KETONES Negative 04/16/2022 10:04 AM    PROTEINURIN Negative 04/16/2022 10:04 AM       RADIOLOGY RESULTS REVIEWED AND INTERPRETED BY ME:  No loadable results found in the system. Pertinent non-system results, if applicable, summarized under history above.      All relevant laboratory and imaging results reported on this note were reviewed and interpreted by me.         Assessment & Plan     Celine Li is a 58 y.o. female with history as noted above whose presentation merits the following diagnostic and clinical status impressions and recommendations:    1. Granulomatosis with polyangiitis of nose (HCC)  Clinical picture remains consistent with seronegative GPA with primarily nasal involvement that has responded well to medical and ENT surgical treatment in addition to ongoing immunosuppressive therapy. Presently clinically and serologically low disease activity that appears to be well-controlled on the current regimen of azathioprine, so no need for modification of treatment..  - CRP and ESR (previously ordered)  - Continue Rituxan 500 mg IV every 6 months (last infusion on 12/7/23 and next in 6/2024 TBD depending on her CD19+/CD20+ B-cell count)  - Continue azathioprine 50 mg oral daily, and consider doubling the dose depending on her clinical trajectory    2. Chronic pansinusitis  Predominant manifestation of her GPA which sometimes has superimposed infection that is managed with long-term topical and oral antimicrobial agents. Given her immunosuppressed status, this raises some concern for drug resistance, so reasonable to screen for MRSA and consider discontinuation of Bactrim.  - MRSA SURVEILLANCE; Future  - CRP and ESR (previously ordered)  -  Okay to continue Bactrim DS 3 times weekly for now  - Okay to continue mupirocin ointment twice daily as needed  - Consider discontinuation of Bactrim depending on her nasal swab results    3. Primary osteoarthritis of both hands  Presumably the etiology of her hand joint pain which can be managed supportively.  - Voltaren 1% gel and Tylenol Arthritis with or without oral NSAIDs as needed  - Trial of paraffin wax hand therapy previously recommended  - Consider intra-articular steroid injection if that becomes necessary    4. Immunosuppressed status (HCC)  Presently with no reported history or physical exam evidence to suggest significant adverse drug effects or opportunistic infections, but need routine monitoring per guidelines, especially in the setting of her elevated ALP.  - MRSA SURVEILLANCE; Future  - CBC and CMP (previously ordered)  - Okay to get COVID-19 vaccine  - Need to ascertain age-appropriate vaccines apart from the ones noted above under immunizations      The above assessment and plan were discussed with the patient who acknowledged understanding of the plan.    FOLLOW-UP: Return in about 3 months (around 5/26/2024) for Short.         Thank you for the opportunity to participate in the care of Celine Li.    Arturo Marc MD, MS, FACR  Rheumatologist, [facility] Rheumatology ? [facility]   of Clinical Medicine, Department of Internal Medicine  [facility] ? [facility]

## 2024-02-26 NOTE — PATIENT INSTRUCTIONS
AFTER VISIT INSTRUCTIONS    Below are important information to help you navigate your healthcare needs and help us serve you safely and effectively:  If laboratory tests and/or imaging studies were ordered, remember to go get them done as instructed.  If new prescriptions and/or refills were sent, remember to go pick them up from your local pharmacy, or call the specialty pharmacy to request shipment.  Always take your prescription medications exactly as prescribed unless instructed otherwise.  Note that antirheumatic drugs and steroids are immunosuppressive which means they increase your risk of infections and have multiple potential adverse effects on various organ systems in your body, though many of them are uncommon.  It is important that you are up-to-date on age-appropriate immunizations, particularly shingles and bacterial/viral pneumonia vaccines, which you can request from me or your primary care provider.  Be sure to read the drug package inserts to learn about the potential side effects of your medications before you start taking them.  If you experience any significant drug side effects, stop taking the medication and notify me promptly, and depending on the severity of the side effects, consider going to an urgent care or emergency department for immediate attention.  If there are significant findings on your lab tests and imaging studies that warrant further action, I will notify you with explanations via [email or web address] or phone call, otherwise you can view them on Sumpto and let me know if you have any questions.  Note that [employer] messages are typically read during office hours and may take 1-7 business days before a response depending on the urgency of the situation and how busy my clinic schedule is.  In general, [employer] messaging is for non-urgent matters that do not require immediate attention, so for urgent matters that cannot wait, you are advised to go to an urgent care.  You are granted [employer]  access to my documentation of your visit and are encouraged to read my note which details my assessment and plan for your condition.  To learn more about your condition and rheumatic diseases evaluated and treated by rheumatologists, as well as gain access to many helpful resources about these diseases, visit our website: [email or web address].  To properly dispose of your unused, unwanted, or residual medications/supplies, visit the Drug Enforcement Administration website to locate your closest drop-off location: www.[place].gov/everyday-takeback-day.

## 2024-05-28 ENCOUNTER — OFFICE VISIT (OUTPATIENT)
Dept: RHEUMATOLOGY | Facility: MEDICAL CENTER | Age: 59
End: 2024-05-28
Attending: STUDENT IN AN ORGANIZED HEALTH CARE EDUCATION/TRAINING PROGRAM
Payer: COMMERCIAL

## 2024-05-28 VITALS
HEIGHT: 67 IN | WEIGHT: 188 LBS | BODY MASS INDEX: 29.51 KG/M2 | SYSTOLIC BLOOD PRESSURE: 118 MMHG | DIASTOLIC BLOOD PRESSURE: 66 MMHG | OXYGEN SATURATION: 98 % | TEMPERATURE: 98.1 F | HEART RATE: 85 BPM

## 2024-05-28 DIAGNOSIS — D84.9 IMMUNOSUPPRESSED STATUS (HCC): ICD-10-CM

## 2024-05-28 DIAGNOSIS — M31.30 GRANULOMATOSIS WITH POLYANGIITIS OF NOSE (HCC): ICD-10-CM

## 2024-05-28 DIAGNOSIS — J32.4 CHRONIC PANSINUSITIS: ICD-10-CM

## 2024-05-28 DIAGNOSIS — M19.042 PRIMARY OSTEOARTHRITIS OF BOTH HANDS: ICD-10-CM

## 2024-05-28 DIAGNOSIS — M19.041 PRIMARY OSTEOARTHRITIS OF BOTH HANDS: ICD-10-CM

## 2024-05-28 PROCEDURE — 3078F DIAST BP <80 MM HG: CPT | Performed by: STUDENT IN AN ORGANIZED HEALTH CARE EDUCATION/TRAINING PROGRAM

## 2024-05-28 PROCEDURE — 99214 OFFICE O/P EST MOD 30 MIN: CPT | Performed by: STUDENT IN AN ORGANIZED HEALTH CARE EDUCATION/TRAINING PROGRAM

## 2024-05-28 PROCEDURE — 3074F SYST BP LT 130 MM HG: CPT | Performed by: STUDENT IN AN ORGANIZED HEALTH CARE EDUCATION/TRAINING PROGRAM

## 2024-05-28 RX ORDER — FOLIC ACID 1 MG/1
2 TABLET ORAL DAILY
Qty: 180 TABLET | Refills: 3 | Status: SHIPPED | OUTPATIENT
Start: 2024-05-28

## 2024-05-28 RX ORDER — AZATHIOPRINE 75 MG/1
1 TABLET ORAL DAILY
Qty: 30 TABLET | Refills: 5 | Status: SHIPPED | OUTPATIENT
Start: 2024-05-28 | End: 2024-05-29 | Stop reason: DRUGHIGH

## 2024-05-28 NOTE — PROGRESS NOTES
[facility] RHEUMATOLOGY  [address], [address], [place] [address]  Phone: [phone] ? Fax: [phone]  [facility]/Health-Services/Rheumatology    FOLLOW-UP VISIT NOTE      DATE OF SERVICE: 05/28/2024         Subjective     PRIMARY CARE PRACTITIONER:  Biju Rivera M.D.  [address]  [place] [id number]    PATIENT IDENTIFICATION:  Celine Li  [address] [place] [address]    YOB: 1965    MEDICAL RECORD NUMBER: [id number]          CHIEF COMPLAINT:   Chief Complaint   Patient presents with    Follow-Up     Granulomatosis with polyangiitis of nose (HCC)       RHEUMATOLOGIC HISTORY:  Celine Li is a 58 y.o. female with pertinent history notable for granulomatosis with polyangiitis of nose diagnosed in 11/2020 (based on nasal tissue biopsy at 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 Previously under the care of [facility] in [place], she initially presented on 3/17/23 to establish care for continued evaluation and management of her GPA. Reported onset of GPA symptoms in 3/2020 with worsening of her chronic pansinusitis leading to nasal septum damage with bloody discharge, fluid-filled sinuses, facial pressure, and ear pressure with vertigo all of which led to her eventual diagnosis of GPA. Reported that though these had improved with treatment since diagnosis, they had been waxing/waning over time with variable degrees of mild to moderate symptoms. Additionally reported history of  photosensitive rash (on face, neck, chest, and arms), cold-induced color changes of fingers, and tingling/numbness of feet.    Pertinent treatments: Prednisone high-to-low dose (effective but caused suicidal ideation), mupirocin 2% ointment PRN (3/2022-present), Bactrim DS 3 times weekly (3/2022-present), methotrexate 15>10 mg oral weekly with folic acid 1 mg daily (started 1/2021, presumably caused elevated liver enzymes, dose decreased 2/2023-12/2023, became less effective), Rituxan 500 mg IV every 6 months (1/2021-present, last dose 12/2023), azathioprine 50 mg oral daily (ordered 12/26/23-present, effective).    Pertinent positive labs: Undetectable CD19+/CD20+ B-cells (in 3/2023); elevated  with otherwise normal CBC (in 5/2024 at [facility]).    Pertinent negative labs: Negative SHAN, ANCA, anti-MPO, anti-PR3, C3, C4, and CH50 (in 3/2023), TPMT 28.9 (in 12/2023), CRP, ESR, eGFR, creatinine, and acceptable LFTs (in 5/2024 at [facility]).      INTERVAL HISTORY:  Reports interval history as noted on the questionnaire below or scanned under media tab.  Notably nasal swab by ENT showed pseudomonas s/p antibiotic therapy in 3/2024.  Waxing/waning stuffy nose with intermittent nosebleeds more recently.  Mild intermittent pain in the hands, feet, and shins with cramping by nighttime (improves with walking).    REVIEW OF SYSTEMS:  Except as noted in the history above, relevant review of systems with emphasis on autoimmune rheumatic diseases was otherwise negative.      ACTIVE PROBLEM LIST:  Patient Active Problem List    Diagnosis Date Noted    Primary osteoarthritis of both hands 09/25/2023    Chronic pansinusitis 03/18/2023    Immunosuppressed status (HCC) 03/18/2023    Peripheral sensory neuropathy 03/18/2023    Acute calculous cholecystitis 04/16/2022    Granulomatosis with polyangiitis of nose (HCC) 04/16/2022       PAST MEDICAL HISTORY:  History reviewed. No pertinent past medical history.    PAST SURGICAL HISTORY:  Past  Surgical History:   Procedure Laterality Date    JERAMY BY LAPAROSCOPY N/A 4/16/2022    Procedure: CHOLECYSTECTOMY, LAPAROSCOPIC;  Surgeon: Demario Lal M.D.;  Location: SURGERY [facility];  Service: General       SOCIAL HISTORY:  Social History     Socioeconomic History    Marital status:      Spouse name: Not on file    Number of children: Not on file    Years of education: Not on file    Highest education level: 11th grade   Occupational History    Not on file   Tobacco Use    Smoking status: Every Day     Current packs/day: 0.50     Average packs/day: 0.5 packs/day for 30.0 years (15.0 ttl pk-yrs)     Types: Cigarettes    Smokeless tobacco: Never   Vaping Use    Vaping status: Never Used   Substance and Sexual Activity    Alcohol use: Not Currently     Comment: occ    Drug use: Never    Sexual activity: Not on file   Other Topics Concern    Not on file   Social History Narrative    Not on file     Social Determinants of Health     Financial Resource Strain: Low Risk  (5/25/2024)    Overall Financial Resource Strain (CARDIA)     Difficulty of Paying Living Expenses: Not hard at all   Food Insecurity: No Food Insecurity (5/25/2024)    Hunger Vital Sign     Worried About Running Out of Food in the Last Year: Never true     Ran Out of Food in the Last Year: Never true   Transportation Needs: No Transportation Needs (5/25/2024)    PRAPARE - Transportation     Lack of Transportation (Medical): No     Lack of Transportation (Non-Medical): No   Physical Activity: Sufficiently Active (5/25/2024)    Exercise Vital Sign     Days of Exercise per Week: 4 days     Minutes of Exercise per Session: 120 min   Stress: No Stress Concern Present (5/25/2024)    [employer] [place] [employer] - Occupational Stress Questionnaire     Feeling of Stress : Not at all   Social Connections: Moderately Isolated (5/25/2024)    Social Connection and Isolation Panel [NHANES]     Frequency of Communication with Friends and  Family: Twice a week     Frequency of Social Gatherings with Friends and Family: Twice a week     Attends Christian Services: Never     Active Member of Clubs or Organizations: No     Attends Club or Organization Meetings: Never     Marital Status:    Intimate Partner Violence: Not on file   Housing Stability: Low Risk  (5/25/2024)    Housing Stability Vital Sign     Unable to Pay for Housing in the Last Year: No     Number of Places Lived in the Last Year: 1     Unstable Housing in the Last Year: No       FAMILY HISTORY:  History reviewed. No pertinent family history.      MEDICATIONS:  Current Outpatient Medications   Medication Sig    Azathioprine 75 MG Tab Take 1 Tablet by mouth every day.    folic acid (FOLVITE) 1 MG Tab Take 2 Tablets by mouth every day. Except the day of methotrexate.    atorvastatin (LIPITOR) 20 MG Tab Take 20 mg by mouth every day.    cyclobenzaprine (FLEXERIL) 10 mg Tab TAKE ONE TABLET BY MOUTH EVERY EVENING AT BEDTIME    amitriptyline (ELAVIL) 10 MG Tab TAKE 1 TO 3 TABLETS BY MOUTH EVERY EVENING AT BEDTIME AS NEEDED    lidocaine-prilocaine (EMLA) 2.5-2.5 % Cream APPLY SPARINGLY TO AREA TOPICALLY PRIOR TO PROCEDURE    sulfamethoxazole-trimethoprim (BACTRIM DS) 800-160 MG tablet TAKE ONE TABLET BY MOUTH EVERY OTHER DAY    mupirocin (BACTROBAN) 2 % Ointment Apply 1 Application. topically 2 times a day.    zolpidem (AMBIEN) 10 MG Tab Take 10 mg by mouth at bedtime.    promethazine (PHENERGAN) 25 MG Tab Take 25 mg by mouth 1 time a day as needed for Nausea/Vomiting. Pt takes with weekly methotrexate       ALLERGIES:   Allergies   Allergen Reactions    Penicillins Vomiting     N/V, fever   Reaction ~15 years ago       IMMUNIZATIONS:  Immunization History   Administered Date(s) Administered    MODERNA SARS-COV-2 VACCINE (12+) 03/03/2021, 10/29/2021            Objective     Vital Signs: /66 (BP Location: Left arm, Patient Position: Sitting, BP Cuff Size: Adult)   Pulse 85   Temp  "36.7 °C (98.1 °F) (Temporal)   Ht 1.702 m (5' 7\")   Wt 85.3 kg (188 lb)   SpO2 98% Body mass index is 29.44 kg/m².    General: Appears well and comfortable  Eyes: No scleral or conjunctival lesions  ENT: Trace hyperemia of bilateral nasal mucosa (much improved from previously)  Head/Neck: No apparent scalp or neck lesions  Cardiovascular: Regular rate and rhythm  Respiratory: Breathing quiet and unlabored  Gastrointestinal: No apparent organomegaly or abdominal masses  Integumentary: No significant cutaneous lesions or discolorations  Musculoskeletal: Poorly localized minimal tenderness of hands; no periarticular soft tissue swelling, warmth, erythema, or overt synovitis; no significant restriction in range of motion of joints examined  Neurologic: No focal sensory or motor deficits  Psychiatric: Mood and affect appropriate      LABORATORY RESULTS REVIEWED AND INTERPRETED BY ME:  Lab Results   Component Value Date/Time    WBC 14.5 (H) 04/16/2022 08:25 AM    RBC 4.91 04/16/2022 08:25 AM    HEMOGLOBIN 15.7 04/16/2022 08:25 AM    HEMATOCRIT 46.9 04/16/2022 08:25 AM    MCV 95.5 04/16/2022 08:25 AM    MCH 32.0 04/16/2022 08:25 AM    MCHC 33.5 (L) 04/16/2022 08:25 AM    RDW 49.1 04/16/2022 08:25 AM    PLATELETCT 294 04/16/2022 08:25 AM    MPV 9.2 04/16/2022 08:25 AM    NEUTS 10.04 (H) 04/16/2022 08:25 AM    LYMPHOCYTES 20.90 (L) 04/16/2022 08:25 AM    MONOCYTES 7.10 04/16/2022 08:25 AM    EOSINOPHILS 1.80 04/16/2022 08:25 AM    BASOPHILS 0.30 04/16/2022 08:25 AM     Lab Results   Component Value Date/Time    ASTSGOT 16 04/16/2022 08:25 AM    ALTSGPT 24 04/16/2022 08:25 AM    ALKPHOSPHAT 112 (H) 04/16/2022 08:25 AM    TBILIRUBIN 0.4 04/16/2022 08:25 AM    TOTPROTEIN 7.2 04/16/2022 08:25 AM    ALBUMIN 4.3 04/16/2022 08:25 AM     Lab Results   Component Value Date/Time    SODIUM 141 04/16/2022 08:25 AM    POTASSIUM 4.5 04/16/2022 08:25 AM    CHLORIDE 105 04/16/2022 08:25 AM    CO2 23 04/16/2022 08:25 AM    GLUCOSE 111 " (H) 04/16/2022 08:25 AM    BUN 17 04/16/2022 08:25 AM    CREATININE 0.57 04/16/2022 08:25 AM    CALCIUM 9.6 04/16/2022 08:25 AM     Lab Results   Component Value Date/Time    COLORURINE Yellow 04/16/2022 10:04 AM    SPECGRAVITY 1.023 04/16/2022 10:04 AM    PHURINE 5.5 04/16/2022 10:04 AM    GLUCOSEUR Negative 04/16/2022 10:04 AM    KETONES Negative 04/16/2022 10:04 AM    PROTEINURIN Negative 04/16/2022 10:04 AM       RADIOLOGY RESULTS REVIEWED AND INTERPRETED BY ME:  No loadable results found in the system. Pertinent non-system results, if applicable, summarized under history above.      All relevant laboratory and imaging results reported on this note were reviewed and interpreted by me.         Assessment & Plan     Celine Li is a 58 y.o. female with history as noted above whose presentation merits the following diagnostic and clinical status impressions and recommendations:    1. Granulomatosis with polyangiitis of nose (HCC)  Clinical picture of seronegative GPA with primarily nasal involvement that has responded reasonably well to ongoing immunosuppressive therapy following past ENT surgical interventions. Presently clinically and serologically low disease activity that appears to respond well on the current regimen of rituximab and azathioprine. However, symptomatically appears to have residual disease, so reasonable to further optimize her azathioprine dosing by increasing the dose from 50 mg to 75 mg daily as noted below.  - B - CELL CD20 EXPRESSION; Future  - CRP QUANTITIVE (NON-CARDIAC); Future  - Sed Rate; Future  - CBC WITH DIFFERENTIAL; Future  - Comp Metabolic Panel; Future  - Azathioprine 75 MG Tab; Take 1 Tablet by mouth every day.  Dispense: 30 Tablet; Refill: 5  - Continue Rituxan 500 mg IV every 6 months (last infusion on 12/7/23 and next in 6/2024)  - Consider Tavneos depending on her clinical trajectory    2. Chronic pansinusitis  Predominant manifestation of her GPA which sometimes  has superimposed infection that is managed with long-term topical and oral antimicrobial agents. Given her immunosuppressed status, this raises some concern for drug resistance, so reasonable to screen for MRSA and consider discontinuation of Bactrim.  - Okay to continue Bactrim DS 3 times weekly for now  - Okay to continue mupirocin ointment twice daily as needed  - Consider discontinuation of Bactrim depending on her clinical trajectory    3. Primary osteoarthritis of both hands  Presumably the etiology of her hand joint pain which can be managed supportively.  - Voltaren 1% gel and Tylenol Arthritis with or without oral NSAIDs as needed  - Paraffin wax hand therapy PRN as previously recommended  - Consider intra-articular steroid injection if that becomes necessary    4. Immunosuppressed status (HCC)  Macrocytosis without anemia is a potential side effect of azathioprine, so reasonable to supplement with folic acid. Presently otherwise with no history, physical, or laboratory evidence to suggest significant adverse drug effects or opportunistic infections, but need routine monitoring per guidelines.  - CBC WITH DIFFERENTIAL; Future  - Comp Metabolic Panel; Future  - folic acid (FOLVITE) 1 MG Tab; Take 2 Tablets by mouth every day. Except the day of methotrexate.  Dispense: 180 Tablet; Refill: 3  - Need to ascertain age-appropriate vaccines in addition to the ones listed above under immunizations      The above assessment and plan were discussed with the patient who acknowledged understanding of the plan.    FOLLOW-UP: Return in about 4 months (around 9/28/2024) for Short.         Thank you for the opportunity to participate in the care of Celine Li.    Arturo Marc MD, MS, FACR  Rheumatologist, [facility], [facility]   of Clinical Medicine, Department of Internal Medicine  [facility]

## 2024-05-28 NOTE — PATIENT INSTRUCTIONS
AFTER VISIT INSTRUCTIONS    Below are important information to help you navigate your healthcare needs and help us serve you safely and effectively:  If laboratory tests and/or imaging studies were ordered, remember to go get them done as instructed.  If new prescriptions and/or refills were sent, remember to go pick them up from your local pharmacy, or call the specialty pharmacy to request shipment.  Always take your prescription medications exactly as prescribed unless instructed otherwise.  Note that antirheumatic drugs and steroids are immunosuppressive which means they increase your risk of infections and have multiple potential adverse effects on various organ systems in your body, though many of them are uncommon.  It is important that you are up-to-date on age-appropriate immunizations, particularly shingles and bacterial/viral pneumonia vaccines, which you can request from me or your primary care provider.  Be sure to read the drug package inserts to learn about the potential side effects of your medications before you start taking them.  If you experience any significant drug side effects, stop taking the medication and notify me promptly, and depending on the severity of the side effects, consider going to an urgent care or emergency department for immediate attention.  If there are significant findings on your lab tests and imaging studies that warrant further action, I will notify you with explanations via [employer] or phone call, otherwise you can view them on [employer] and let me know if you have any questions.  Note that [employer] messages are typically read during office hours and may take 1-7 business days before a response depending on the urgency of the situation and how busy my clinic schedule is.  In general, [employer] messaging is for non-urgent matters that do not require immediate attention, so for urgent matters that cannot wait, you are advised to go to an urgent care.  You are granted [employer]  access to my documentation of your visit and are encouraged to read my note which details my assessment and plan for your condition.  To learn more about your condition and rheumatic diseases evaluated and treated by rheumatologists, as well as gain access to many helpful resources about these diseases, visit our website: [email or web address].  To properly dispose of your unused, unwanted, or residual medications/supplies, visit the Drug Enforcement Administration website to locate your closest drop-off location: www.[place].gov/everyday-takeback-day.

## 2024-05-29 ENCOUNTER — TELEPHONE (OUTPATIENT)
Dept: RHEUMATOLOGY | Facility: MEDICAL CENTER | Age: 59
End: 2024-05-29

## 2024-05-29 DIAGNOSIS — M31.30 GRANULOMATOSIS WITH POLYANGIITIS OF NOSE (HCC): ICD-10-CM

## 2024-05-29 RX ORDER — AZATHIOPRINE 50 MG/1
75 TABLET ORAL DAILY
Qty: 45 TABLET | Refills: 5 | Status: SHIPPED | OUTPATIENT
Start: 2024-05-29 | End: 2024-09-26

## 2024-05-29 NOTE — TELEPHONE ENCOUNTER
Patient wanting to restart Rituxan infusion asap, wanting to schedule for 6/1/24. [facility] asking for 's approval for infusion  Please advise,

## 2024-05-30 NOTE — TELEPHONE ENCOUNTER
Okay to proceed with rituximab infusion. I have also changed her azathioprine prescription to the 50 mg tablets approved by her insurance. She just needs to take 1.5 tablets to make 75 mg.    KU

## 2024-09-26 ENCOUNTER — OFFICE VISIT (OUTPATIENT)
Dept: RHEUMATOLOGY | Facility: MEDICAL CENTER | Age: 59
End: 2024-09-26
Attending: STUDENT IN AN ORGANIZED HEALTH CARE EDUCATION/TRAINING PROGRAM
Payer: COMMERCIAL

## 2024-09-26 VITALS
HEART RATE: 87 BPM | BODY MASS INDEX: 29.51 KG/M2 | HEIGHT: 67 IN | OXYGEN SATURATION: 97 % | DIASTOLIC BLOOD PRESSURE: 60 MMHG | SYSTOLIC BLOOD PRESSURE: 114 MMHG | WEIGHT: 188 LBS | TEMPERATURE: 97 F

## 2024-09-26 DIAGNOSIS — M19.041 PRIMARY OSTEOARTHRITIS OF BOTH HANDS: ICD-10-CM

## 2024-09-26 DIAGNOSIS — D84.9 IMMUNOSUPPRESSED STATUS (HCC): ICD-10-CM

## 2024-09-26 DIAGNOSIS — J32.4 CHRONIC PANSINUSITIS: ICD-10-CM

## 2024-09-26 DIAGNOSIS — M19.042 PRIMARY OSTEOARTHRITIS OF BOTH HANDS: ICD-10-CM

## 2024-09-26 DIAGNOSIS — M31.30 GRANULOMATOSIS WITH POLYANGIITIS OF NOSE (HCC): ICD-10-CM

## 2024-09-26 PROCEDURE — 3078F DIAST BP <80 MM HG: CPT | Performed by: STUDENT IN AN ORGANIZED HEALTH CARE EDUCATION/TRAINING PROGRAM

## 2024-09-26 PROCEDURE — 99212 OFFICE O/P EST SF 10 MIN: CPT | Performed by: STUDENT IN AN ORGANIZED HEALTH CARE EDUCATION/TRAINING PROGRAM

## 2024-09-26 PROCEDURE — 3074F SYST BP LT 130 MM HG: CPT | Performed by: STUDENT IN AN ORGANIZED HEALTH CARE EDUCATION/TRAINING PROGRAM

## 2024-09-26 PROCEDURE — 99214 OFFICE O/P EST MOD 30 MIN: CPT | Performed by: STUDENT IN AN ORGANIZED HEALTH CARE EDUCATION/TRAINING PROGRAM

## 2024-09-26 RX ORDER — AZATHIOPRINE 50 MG/1
100 TABLET ORAL DAILY
Qty: 180 TABLET | Refills: 3
Start: 2024-09-26

## 2024-09-26 NOTE — PROGRESS NOTES
[facility] RHEUMATOLOGY  [address], [address], [place] [address]  Phone: [phone] ? Fax: [phone]  [facility]/Health-Services/Rheumatology    FOLLOW-UP VISIT NOTE      DATE OF SERVICE: 09/26/2024         Subjective     PRIMARY CARE PRACTITIONER:  Biju Rivera M.D.  [address]  [place] [address]    PATIENT IDENTIFICATION:  Celine Li  [address] [place] [address]    YOB: 1965    MEDICAL RECORD NUMBER: [id number]          CHIEF COMPLAINT:   Chief Complaint   Patient presents with    Follow-Up     Granulomatosis with polyangiitis of nose (HCC)       RHEUMATOLOGIC HISTORY:  Celine Li is a 58 y.o. 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 Previously under the care of [facility] in [place], she initially presented on 3/17/23 to establish care for continued evaluation and management of her GPA. Reported onset of GPA symptoms in 3/2020 with worsening of her chronic pansinusitis leading to nasal septum damage with bloody discharge, fluid-filled sinuses, facial pressure, and ear pressure with vertigo all of which led to her eventual diagnosis of GPA. Reported that though these had improved with treatment since diagnosis, they had been waxing/waning over time with variable degrees of mild to moderate symptoms. She  additionally reported history of photosensitive rash (on face, neck, chest, and arms), cold-induced color changes of fingers, and tingling/numbness of feet.    Pertinent treatments: Prednisone high-to-low dose (effective but caused suicidal ideation), mupirocin 2% ointment PRN (3/2022-present), Bactrim DS 3 times weekly (3/2022-present), methotrexate 15>10 mg oral weekly with folic acid 1 mg daily (started 1/2021, presumably caused elevated liver enzymes, dose decreased 2/2023-12/2023, became less effective), Rituxan 500 mg IV every 6 months (1/2021-present), azathioprine 50>75 mg oral daily (ordered 12/26/23, dose increased to 5/28/24-present, effective).    Pertinent positive labs: Undetectable B-cells CD19+/CD20+ expression at 99% viability (in 9/2024 at [facility]).    Pertinent negative labs: Negative SHAN, ANCA, anti-MPO, anti-PR3, C3, C4, and CH50 (in 3/2023), TPMT 28.9 (in 12/2023), CRP, ESR, eGFR, creatinine, LFTs, and acceptable CBC (in 9/2024 at [facility]).      INTERVAL HISTORY:  Reports interval history as noted on the questionnaire below or scanned under media tab.  Notably somewhat more symptomatic with nasal mucus and earache/pressure with changing weather.  Mild intermittent pain in the hands, feet, and hips that tend to worsen with overactivity.  Status post cefepime for 5 weeks (caused low PLT) switched to meropenem for 3 weeks.  Has since been managing with nasal rinses and ibuprofen as needed which have been helpful.    REVIEW OF SYSTEMS:  Except as noted in the history above, relevant review of systems with emphasis on autoimmune rheumatic diseases was otherwise negative.      CURRENT PROBLEM LIST:  Patient Active Problem List    Diagnosis Date Noted    Primary osteoarthritis of both hands 09/25/2023    Chronic pansinusitis 03/18/2023    Immunosuppressed status (HCC) 03/18/2023    Peripheral sensory neuropathy 03/18/2023    Acute calculous cholecystitis 04/16/2022    Granulomatosis with polyangiitis of nose  ([facility]) 04/16/2022       PAST MEDICAL HISTORY:  History reviewed. No pertinent past medical history.    PAST SURGICAL HISTORY:  Past Surgical History:   Procedure Laterality Date    JERAMY BY LAPAROSCOPY N/A 4/16/2022    Procedure: CHOLECYSTECTOMY, LAPAROSCOPIC;  Surgeon: Demario Lal M.D.;  Location: SURGERY [facility];  Service: General       SOCIAL HISTORY:  Social History     Socioeconomic History    Marital status:      Spouse name: Not on file    Number of children: Not on file    Years of education: Not on file    Highest education level: 11th grade   Occupational History    Not on file   Tobacco Use    Smoking status: Every Day     Current packs/day: 0.50     Average packs/day: 0.5 packs/day for 30.0 years (15.0 ttl pk-yrs)     Types: Cigarettes    Smokeless tobacco: Never   Vaping Use    Vaping status: Never Used   Substance and Sexual Activity    Alcohol use: Not Currently     Comment: occ    Drug use: Never    Sexual activity: Not on file   Other Topics Concern    Not on file   Social History Narrative    Not on file     Social Determinants of Health     Financial Resource Strain: Low Risk  (5/25/2024)    Overall Financial Resource Strain (CARDIA)     Difficulty of Paying Living Expenses: Not hard at all   Food Insecurity: No Food Insecurity (5/25/2024)    Hunger Vital Sign     Worried About Running Out of Food in the Last Year: Never true     Ran Out of Food in the Last Year: Never true   Transportation Needs: No Transportation Needs (5/25/2024)    PRAPARE - Transportation     Lack of Transportation (Medical): No     Lack of Transportation (Non-Medical): No   Physical Activity: Sufficiently Active (5/25/2024)    Exercise Vital Sign     Days of Exercise per Week: 4 days     Minutes of Exercise per Session: 120 min   Stress: No Stress Concern Present (5/25/2024)    [employer] [place] [employer] - Occupational Stress Questionnaire     Feeling of Stress : Not at all   Social Connections:  Moderately Isolated (5/25/2024)    Social Connection and Isolation Panel [NHANES]     Frequency of Communication with Friends and Family: Twice a week     Frequency of Social Gatherings with Friends and Family: Twice a week     Attends Voodoo Services: Never     Active Member of Clubs or Organizations: No     Attends Club or Organization Meetings: Never     Marital Status:    Intimate Partner Violence: Not on file   Housing Stability: Low Risk  (5/25/2024)    Housing Stability Vital Sign     Unable to Pay for Housing in the Last Year: No     Number of Places Lived in the Last Year: 1     Unstable Housing in the Last Year: No       FAMILY HISTORY:  History reviewed. No pertinent family history.      MEDICATIONS:  Current Outpatient Medications   Medication Sig    azaTHIOprine (IMURAN) 50 MG Tab Take 2 Tablets by mouth every day.    folic acid (FOLVITE) 1 MG Tab Take 2 Tablets by mouth every day. Except the day of methotrexate.    atorvastatin (LIPITOR) 20 MG Tab Take 20 mg by mouth every day.    cyclobenzaprine (FLEXERIL) 10 mg Tab TAKE ONE TABLET BY MOUTH EVERY EVENING AT BEDTIME    amitriptyline (ELAVIL) 10 MG Tab TAKE 1 TO 3 TABLETS BY MOUTH EVERY EVENING AT BEDTIME AS NEEDED    lidocaine-prilocaine (EMLA) 2.5-2.5 % Cream APPLY SPARINGLY TO AREA TOPICALLY PRIOR TO PROCEDURE    mupirocin (BACTROBAN) 2 % Ointment Apply 1 Application. topically 2 times a day.    zolpidem (AMBIEN) 10 MG Tab Take 10 mg by mouth at bedtime.    promethazine (PHENERGAN) 25 MG Tab Take 25 mg by mouth 1 time a day as needed for Nausea/Vomiting. Pt takes with weekly methotrexate       ALLERGIES:   Allergies   Allergen Reactions    Penicillins Vomiting     N/V, fever   Reaction ~15 years ago       IMMUNIZATIONS:  Immunization History   Administered Date(s) Administered    MODERNA SARS-COV-2 VACCINE (12+) 03/03/2021, 10/29/2021            Objective     Vital Signs: /60 (BP Location: Left arm, Patient Position: Sitting, BP Cuff  "Size: Large adult)   Pulse 87   Temp 36.1 °C (97 °F) (Temporal)   Ht 1.702 m (5' 7\")   Wt 85.3 kg (188 lb)   SpO2 97% Body mass index is 29.44 kg/m².    General: Appears well and comfortable  Eyes: No scleral or conjunctival lesions  ENT: Trace hyperemia of bilateral nasal mucosa (improved from previously)  Head/Neck: No apparent scalp or neck lesions  Cardiovascular: Regular rate and rhythm  Respiratory: Breathing quiet and unlabored  Gastrointestinal: No apparent organomegaly or abdominal masses  Integumentary: No significant cutaneous lesions or discolorations  Musculoskeletal: Poorly localized minimal tenderness of the hands and feet; no periarticular soft tissue swelling, warmth, erythema, or overt synovitis; no significant restriction in range of motion of joints examined  Neurologic: No focal sensory or motor deficits  Psychiatric: Mood and affect appropriate      LABORATORY RESULTS REVIEWED AND INTERPRETED BY ME:  Lab Results   Component Value Date/Time    WBC 14.5 (H) 04/16/2022 08:25 AM    RBC 4.91 04/16/2022 08:25 AM    HEMOGLOBIN 15.7 04/16/2022 08:25 AM    HEMATOCRIT 46.9 04/16/2022 08:25 AM    MCV 95.5 04/16/2022 08:25 AM    MCH 32.0 04/16/2022 08:25 AM    MCHC 33.5 (L) 04/16/2022 08:25 AM    RDW 49.1 04/16/2022 08:25 AM    PLATELETCT 294 04/16/2022 08:25 AM    MPV 9.2 04/16/2022 08:25 AM    NEUTS 10.04 (H) 04/16/2022 08:25 AM    LYMPHOCYTES 20.90 (L) 04/16/2022 08:25 AM    MONOCYTES 7.10 04/16/2022 08:25 AM    EOSINOPHILS 1.80 04/16/2022 08:25 AM    BASOPHILS 0.30 04/16/2022 08:25 AM     Lab Results   Component Value Date/Time    ASTSGOT 16 04/16/2022 08:25 AM    ALTSGPT 24 04/16/2022 08:25 AM    ALKPHOSPHAT 112 (H) 04/16/2022 08:25 AM    TBILIRUBIN 0.4 04/16/2022 08:25 AM    TOTPROTEIN 7.2 04/16/2022 08:25 AM    ALBUMIN 4.3 04/16/2022 08:25 AM     Lab Results   Component Value Date/Time    SODIUM 141 04/16/2022 08:25 AM    POTASSIUM 4.5 04/16/2022 08:25 AM    CHLORIDE 105 04/16/2022 08:25 AM    " CO2 23 04/16/2022 08:25 AM    GLUCOSE 111 (H) 04/16/2022 08:25 AM    BUN 17 04/16/2022 08:25 AM    CREATININE 0.57 04/16/2022 08:25 AM    CALCIUM 9.6 04/16/2022 08:25 AM     Lab Results   Component Value Date/Time    COLORURINE Yellow 04/16/2022 10:04 AM    SPECGRAVITY 1.023 04/16/2022 10:04 AM    PHURINE 5.5 04/16/2022 10:04 AM    GLUCOSEUR Negative 04/16/2022 10:04 AM    KETONES Negative 04/16/2022 10:04 AM    PROTEINURIN Negative 04/16/2022 10:04 AM       RADIOLOGY RESULTS REVIEWED AND INTERPRETED BY ME:  No loadable results found in the system. Pertinent non-system results, if applicable, summarized under history above.      All relevant laboratory and imaging results reported on this note were reviewed and interpreted by me.         Assessment & Plan     Celine Li is a 58 y.o. female with history as noted above whose presentation merits the following diagnostic and clinical status impressions and recommendations:    1. Granulomatosis with polyangiitis of nose (HCC)  Active problem with clinical picture of seronegative GPA with primarily nasal involvement that has responded reasonably well to ongoing immunosuppressive therapy following past ENT surgical interventions. Presently clinically and serologically without significant evidence of disease activity on the current regimen of rituximab and azathioprine. However, given her reported residual symptoms suggestive of residual disease activity, reasonable to further optimize her azathioprine dose as noted below.  - CRP and ESR (previously ordered, so reprinted)  - Plan to reorder B-cells CD19+/CD20+ expression testing at next visit  - Continue Rituxan 500 mg IV infusion every 6 months (last in 6/2024 and next in 12/2024)  - Increase azathioprine 75 mg to 100 mg daily with folic acid 2 mg daily  - Consider Tavneos depending on her clinical trajectory    2. Chronic pansinusitis  Active problem that is the predominant manifestation of her GPA which  sometimes has superimposed infection that is managed with long-term topical and oral antimicrobial agents. Given her immunosuppressed status, this raises some concern for microbial drug resistance for which she recently completed treatment with cefepime and meropenem.  - Continue moisturizing nasal rinses as needed  - Okay to continue mupirocin ointment twice daily as needed    3. Primary osteoarthritis of both hands  Active problem that is presumably the most significant etiology of her overall joint pain which can be managed supportively.  - Paraffin wax hand therapy PRN as previously recommended  - Tylenol Arthritis and Voltaren 1% gel with or without oral NSAIDs as needed  - Consider intra-articular steroid injection if that becomes necessary    4. Immunosuppressed status (HCC)  Secondary to immunosuppressive therapy, but presently with no history, physical, or laboratory evidence to suggest significant adverse drug effects or opportunistic infections, but need routine monitoring per guidelines.  - CBC and CMP (previously ordered, so reprinted)  - Need to ascertain age-appropriate vaccines in addition to the ones listed above under immunizations      The above assessment and plan were discussed with the patient who acknowledged understanding of the plan.    FOLLOW-UP: Return in about 4 months (around 1/26/2025) for Short.         Thank you for the opportunity to participate in the care of Celine Li.    Arturo Marc MD, MS, FACR  Rheumatologist, [facility] Rheumatology, [facility]   of Clinical Medicine, Department of Internal Medicine  [facility]

## 2024-09-26 NOTE — PATIENT INSTRUCTIONS
[facility]    Below are important guidelines to help you navigate your health care needs and assist us in caring for you safely and effectively. We encourage you to carefully read and understand this information and adhere to them accordingly.    [facility] Messaging and Phone Calls:  Diagnosis and Treatment - For a detailed explanation of your condition and treatment plan from today's visit, refer to the visit note on [address] via the following steps:  Log in to [facility] [address] and click on “Visits” at the top.  Scroll down to “Past Visits” under Appointments.  Click on “View Notes” under the appropriate visit date.  Questions or Concerns - MyChart messaging is for non-urgent matters that do not require immediate attention and should be brief with no more than two questions or concerns. If you have multiple questions or concerns, we ask that you schedule an appointment to have them properly addressed.  Response to Messages - [facility] messages are addressed throughout the week depending on clinical availability, so we ask that you allow up to one week for a response.  Phone Calls and Voicemails - Phone calls and voicemail messages are reserved for time-sensitive matters that cannot wait to be addressed via [facility] [address]. We ask that you refrain from calling the office multiple times or leaving multiple voicemails regarding the same issue as doing so may lead to delays in response time.  Urgent Issues - For urgent medical matters or medical emergencies that cannot wait, you are advised to go to your nearest Urgent Care or Emergency Department for immediate attention.    Laboratory Tests and Imaging Studies:  Future Lab and Imaging Orders - We ask that you get your lab tests and imaging studies done no later than one week before your follow-up visit unless instructed otherwise.  Results Communication - You may see some test results marked as “abnormal” that are not necessarily significant or concerning. If  there are significant abnormalities on your test results that warrant further action, you will be notified via MyChart or phone call, otherwise they will be addressed at your follow-up visit.    Prescriptions and Refill Requests:  General Prescriptions (e.g. prednisone, hydroxychloroquine, leflunomide, methotrexate, etc.) - These are sent to Retail Pharmacies, so all refill requests of these medications should be directed to your local pharmacy.  Specialty Prescriptions (e.g. Enbrel, Humira, Cosentyx, Xeljanz, etc.) - These are sent to Specialty Pharmacies, so all refill requests of these medications should be directed to your designated specialty pharmacy.  Infusion Prescriptions (e.g. Remicade, Simponi Aria, Rituxan, Saphnelo, etc.) - These are sent to Outpatient Infusion Centers, so all scheduling requests of these medications should be directed to your local infusion center.    Medication Risks and Adverse Effects:  Immunosuppressed Status - Steroids and antirheumatic drugs are immunosuppressants, so they increase the risk of infections and can have side effects on various organ systems in your body, though most of them are uncommon.  Potential Side Effects - Be sure to read the drug package inserts to learn about the potential side effects of your medications before you start taking them and take them exactly as prescribed unless instructed otherwise.  In Case of Side Effects - If you experience any significant side effects, stop taking the medication immediately and promptly notify the prescriber. Depending on the severity of the side effects, consider going to an Urgent Care or Emergency Department for immediate attention.    Immunizations and Health Screening:  Vaccinations - If you are on immunosuppressive therapy, it is important that you are up to date on age-appropriate immunizations, particularly shingles and pneumonia vaccines, which you can request from your primary care provider or from us at your  next appointment.  Screening Tests - It is also important that you are up to date on age-appropriate screening tests, such as pap smear, mammography, and colonoscopy, which you can request from your primary care provider.    Educational and Supportive Resources:  [facility] ([email or web address]) - Visit our website to learn more about your condition and other rheumatic diseases, and gain access to many helpful resources for them.  Disposal of Old Medications (www.[place].gov/everyday-takeback-day) - Visit the Drug Enforcement Administration website to find a nearby location where you can properly dispose of old medications you no longer need.  Disposal of Used [place] (www.safeneedledisposal.org) - Visit the Safe Needle Disposal Organization website to find a nearby location where you can properly dispose of used needles from your injectable medications.

## 2024-11-25 ENCOUNTER — TELEPHONE (OUTPATIENT)
Dept: RHEUMATOLOGY | Facility: MEDICAL CENTER | Age: 59
End: 2024-11-25
Payer: COMMERCIAL

## 2024-11-26 NOTE — TELEPHONE ENCOUNTER
[facility] called for orders of pre-meds for patient's Rituxican infusion, Dr.Ukadike begum with   Tylenol 1000 mg , Benadryl 50mg, Medrol 80mg IV PRN.   Upon waiting for response, [facility] clinic gave patient 650mg of Tylenol and 25mg of Benadryl.

## 2024-12-09 NOTE — TELEPHONE ENCOUNTER
[facility] requesting updated orders for Rituximab including pre-medications.   Patient previously given Tylenol, Benadryl and Solumedrol.     After speaking with pharmacist, they will accept verbal orders and re-activate infusion order

## 2025-01-12 ASSESSMENT — RHEUMATOLOGY FOLLOW-UP QUESTIONNAIRE
BODY ACHES: Y
TINGLING: Y
EAR PAIN: Y
DURATION: 30-60 MINS
RATE_1TO10: 7
FATIGUE: Y
JOINT SWELLING: Y
NOSEBLEEDS: Y
JOINT PAIN: SAME WITH ACTIVITY
BURNING: Y
ORAL ULCERS: Y
MARK ALL THE AREAS OF PAIN: 112867952
SINUS PAIN: Y
CHARACTERISTIC: SAME WITH ACTIVITY
INSOMNIA: Y
NASAL CONGESTION: Y

## 2025-01-16 ENCOUNTER — TELEPHONE (OUTPATIENT)
Dept: RHEUMATOLOGY | Facility: MEDICAL CENTER | Age: 60
End: 2025-01-16

## 2025-01-16 ENCOUNTER — OFFICE VISIT (OUTPATIENT)
Dept: RHEUMATOLOGY | Facility: MEDICAL CENTER | Age: 60
End: 2025-01-16
Attending: STUDENT IN AN ORGANIZED HEALTH CARE EDUCATION/TRAINING PROGRAM
Payer: COMMERCIAL

## 2025-01-16 ENCOUNTER — HOSPITAL ENCOUNTER (OUTPATIENT)
Dept: RADIOLOGY | Facility: MEDICAL CENTER | Age: 60
End: 2025-01-16
Attending: STUDENT IN AN ORGANIZED HEALTH CARE EDUCATION/TRAINING PROGRAM
Payer: COMMERCIAL

## 2025-01-16 VITALS
TEMPERATURE: 98 F | WEIGHT: 200 LBS | OXYGEN SATURATION: 97 % | DIASTOLIC BLOOD PRESSURE: 64 MMHG | SYSTOLIC BLOOD PRESSURE: 114 MMHG | HEART RATE: 92 BPM | BODY MASS INDEX: 31.39 KG/M2 | HEIGHT: 67 IN

## 2025-01-16 DIAGNOSIS — J32.4 CHRONIC PANSINUSITIS: ICD-10-CM

## 2025-01-16 DIAGNOSIS — D84.9 IMMUNOSUPPRESSED STATUS (HCC): ICD-10-CM

## 2025-01-16 DIAGNOSIS — G89.29 CHRONIC PAIN OF RIGHT HIP: ICD-10-CM

## 2025-01-16 DIAGNOSIS — M19.042 PRIMARY OSTEOARTHRITIS OF BOTH HANDS: ICD-10-CM

## 2025-01-16 DIAGNOSIS — M31.30 GRANULOMATOSIS WITH POLYANGIITIS OF NOSE (HCC): ICD-10-CM

## 2025-01-16 DIAGNOSIS — M19.041 PRIMARY OSTEOARTHRITIS OF BOTH HANDS: ICD-10-CM

## 2025-01-16 DIAGNOSIS — M25.551 ARTHRALGIA OF RIGHT HIP: ICD-10-CM

## 2025-01-16 DIAGNOSIS — M25.551 CHRONIC PAIN OF RIGHT HIP: ICD-10-CM

## 2025-01-16 PROCEDURE — 99214 OFFICE O/P EST MOD 30 MIN: CPT | Performed by: STUDENT IN AN ORGANIZED HEALTH CARE EDUCATION/TRAINING PROGRAM

## 2025-01-16 PROCEDURE — 99212 OFFICE O/P EST SF 10 MIN: CPT | Performed by: STUDENT IN AN ORGANIZED HEALTH CARE EDUCATION/TRAINING PROGRAM

## 2025-01-16 PROCEDURE — 3074F SYST BP LT 130 MM HG: CPT | Performed by: STUDENT IN AN ORGANIZED HEALTH CARE EDUCATION/TRAINING PROGRAM

## 2025-01-16 PROCEDURE — 3078F DIAST BP <80 MM HG: CPT | Performed by: STUDENT IN AN ORGANIZED HEALTH CARE EDUCATION/TRAINING PROGRAM

## 2025-01-16 PROCEDURE — 73502 X-RAY EXAM HIP UNI 2-3 VIEWS: CPT | Mod: RT

## 2025-01-16 RX ORDER — TOPIRAMATE 25 MG/1
TABLET, FILM COATED ORAL
COMMUNITY

## 2025-01-16 RX ORDER — AVACOPAN 10 MG/1
30 CAPSULE ORAL 2 TIMES DAILY
Qty: 180 CAPSULE | Refills: 5 | Status: SHIPPED | OUTPATIENT
Start: 2025-01-16 | End: 2025-02-15

## 2025-01-16 RX ORDER — MELOXICAM 15 MG/1
15 TABLET ORAL DAILY
COMMUNITY

## 2025-01-16 RX ORDER — QUETIAPINE FUMARATE 25 MG/1
TABLET, FILM COATED ORAL
COMMUNITY

## 2025-01-16 RX ORDER — AVACOPAN 10 MG/1
3 CAPSULE ORAL 2 TIMES DAILY
Qty: 60 CAPSULE | Refills: 5 | Status: SHIPPED | OUTPATIENT
Start: 2025-01-16 | End: 2025-01-16 | Stop reason: DRUGHIGH

## 2025-01-16 ASSESSMENT — PATIENT HEALTH QUESTIONNAIRE - PHQ9: CLINICAL INTERPRETATION OF PHQ2 SCORE: 0

## 2025-01-16 NOTE — PATIENT INSTRUCTIONS
[facility]    Below are important guidelines to help you navigate your health care needs and assist us in caring for you safely and effectively. We encourage you to carefully read and understand this information and adhere to them accordingly.    [place] Messaging and Phone Calls:  Diagnosis and Treatment - For a detailed explanation of your condition and treatment plan from today's visit, refer to the visit note on [employer] via the following steps:  Log in to [employer] and click on “Visits” at the top.  Scroll down to “Past Visits” under Appointments.  Click on “View Notes” under the appropriate visit date.  Questions or Concerns - MyChart messaging is for non-urgent matters that do not require immediate attention and should be brief with no more than two questions or concerns. If you have multiple questions or concerns, we ask that you schedule an appointment to have them properly addressed.  Response to Messages - [employer] messages are addressed throughout the week depending on clinical availability, so we ask that you allow up to one week for a response.  Phone Calls and Voicemails - Phone calls and voicemail messages are reserved for time-sensitive matters that cannot wait to be addressed via [employer]. We ask that you refrain from calling the office multiple times or leaving multiple voicemails regarding the same issue as doing so may lead to delays in response time.  Urgent Issues - For urgent medical matters or medical emergencies that cannot wait, you are advised to go to your nearest Urgent Care or Emergency Department for immediate attention.    Laboratory Tests and Imaging Studies:  Future Lab and Imaging Orders - We ask that you get your lab tests and imaging studies done no later than one week before your follow-up visit unless instructed otherwise.  Results Communication - You may see some test results marked as “abnormal” that are not necessarily significant or concerning. If  there are significant abnormalities on your test results that warrant further action, you will be notified via MyChart or phone call, otherwise they will be addressed at your follow-up visit.    Prescriptions and Refill Requests:  General Prescriptions (e.g. prednisone, hydroxychloroquine, leflunomide, methotrexate, etc.) - These are sent to Retail Pharmacies, so all refill requests of these medications should be directed to your local pharmacy.  Specialty Prescriptions (e.g. Enbrel, Humira, Cosentyx, Xeljanz, etc.) - These are sent to Specialty Pharmacies, so all refill requests of these medications should be directed to your designated specialty pharmacy.  Infusion Prescriptions (e.g. Remicade, Simponi Aria, Rituxan, Saphnelo, etc.) - These are sent to Outpatient Infusion Centers, so all scheduling requests of these medications should be directed to your local infusion center.    Medication Risks and Adverse Effects:  Immunosuppressed Status - Steroids and antirheumatic drugs are immunosuppressants, so they increase the risk of infections and can have side effects on various organ systems in your body, though most of them are uncommon.  Potential Side Effects - Be sure to read the drug package inserts to learn about the potential side effects of your medications before you start taking them and take them exactly as prescribed unless instructed otherwise.  In Case of Side Effects - If you experience any significant side effects, stop taking the medication immediately and promptly notify the prescriber. Depending on the severity of the side effects, consider going to an Urgent Care or Emergency Department for immediate attention.    Immunizations and Health Screening:  Vaccinations - If you are on immunosuppressive therapy, it is important that you are up to date on age-appropriate immunizations, particularly shingles and pneumonia vaccines, which you can request from your primary care provider or from us at your  next appointment.  Screening Tests - It is also important that you are up to date on age-appropriate screening tests, such as pap smear, mammography, and colonoscopy, which you can request from your primary care provider.    Educational and Supportive Resources:  [facility] ([email or web address]) - Visit our website to learn more about your condition and other rheumatic diseases, and gain access to many helpful resources for them.  Disposal of Old Medications (www.[place].gov/everyday-takeback-day) - Visit the Drug Enforcement Administration website to find a nearby location where you can properly dispose of old medications you no longer need.  Disposal of Used [place] (www.safeneedledisposal.org) - Visit the Safe Needle Disposal Organization website to find a nearby location where you can properly dispose of used needles from your injectable medications.

## 2025-01-16 NOTE — TELEPHONE ENCOUNTER
Prior Authorization for Tavneos 10MG capsules (Quantity: 180, Days: 30) has been submitted via Cover My Meds: Key ([id number])    Insurance: [employer]    Will follow up in 24-48 business hours.       ---- Dr. Marc, can you send me a new prescription with the quantity of 180 for 30 day supply instead of 60 for 15 days supply?

## 2025-01-16 NOTE — PROGRESS NOTES
[facility] RHEUMATOLOGY  [address], [address], [place] [address]  Phone: [phone] ? Fax: [phone]  [facility]/Health-Services/Rheumatology    FOLLOW-UP VISIT NOTE         Subjective     DATE OF SERVICE: 01/16/2025    PRIMARY CARE PROVIDER:  Biju Rivera M.D.  [address]  [place] [address]    PATIENT IDENTIFICATION:  Celine Li  [address] [place] [address]    YOB: 1965    MEDICAL RECORD NUMBER: [id number]         CHIEF COMPLAINT:   Chief Complaint   Patient presents with    Follow-Up     Granulomatosis with polyangiitis of nose (HCC)       RHEUMATOLOGIC HISTORY:  Celine Li is a 59 y.o. 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 Previously under the care of [facility] Rheumatology in [place], she initially presented on 3/17/23 to establish care for continued evaluation and management of her GPA. Reported onset of GPA symptoms in 3/2020 with worsening of her chronic pansinusitis leading to nasal septum damage with bloody discharge, fluid-filled sinuses, facial pressure, and ear pressure with vertigo all of which led to her eventual diagnosis of GPA. Reported that though these had improved with treatment since diagnosis, they had been waxing/waning over time with variable degrees of mild to moderate symptoms. She additionally reported history of  photosensitive rash (on face, neck, chest, and arms), cold-induced color changes of fingers, and tingling/numbness of feet.    Pertinent treatments: Prednisone high-to-low dose (effective but caused suicidal ideation), mupirocin 2% ointment PRN (3/2022-present), Bactrim DS 3 times weekly (3/2022-present), methotrexate 15>10 mg oral weekly with folic acid 1 mg daily (started 1/2021, presumably caused elevated liver enzymes, dose decreased 2/2023-12/2023, became less effective), Rituxan 500 mg IV every 6 months (1/2021-11/2024, unclear benefit), azathioprine 50>75>100 mg oral daily (ordered 12/26/23-present, unclear benefit), Tavneos 30 mg BID (ordered 1/16/25-present).    Pertinent positive labs: Undetectable B-cells CD19+/CD20+ expression at 99% viability (in 9/2024 at [facility]).    Pertinent negative labs: Negative SHAN, ANCA, anti-MPO, anti-PR3, C3, C4, and CH50 (in 3/2023), TPMT 28.9 (in 12/2023), CRP, ESR, eGFR, creatinine, LFTs, and acceptable CBC (in 9/2024 at [facility]).      INTERVAL HISTORY:  Reports interval history as noted on the questionnaire below or scanned under media tab.  [facility] Established Patient History Form    1/12/2025  8:36 AM PST - Filed by Patient   MAIN REASON FOR VISIT Follow up appointment for GPA   INTERVAL HISTORY OF ILLNESS   Date of worsening onset: November 2024   Preceding incident/ailment: Nasal tenderness, bleeding   Describe/list your symptoms: Nasal bleeding, crusting, tenderness to the touch of the nose, pressure under eyes and ear pain.   Exacerbating factors: unsure what triggered this.   Alleviating factors: nasal rinse, ointment and elevating head.   Helpful medications: Mupirocin Ointment, Saline Rinse, Bactrim and Ibuprofen   Ineffective medications: none   Severity of pain (scale of 1-10): 7   Personal/emotional stressors: none   Luke All The Areas Of Pain    REVIEW OF SYMPTOMS    General   Fevers    Chills    Night sweats    Malaise    Fatigue Yes   Unintentional weight  loss    Musculoskeletal   Joint pain Same with activity   Morning stiffness duration 30-60 mins   Morning stiffness characteristic Same with activity   Joint swelling Yes   Joint instability    Tendon pain    Muscle pain    Body aches Yes   Dermatologic   Hair loss with bald spots    Hair shedding    Skin thickening    Skin plaques    Sunlight-induced skin rash    Cold-induced color changes (white, purple, red on rewarming)    Neurologic/Psychiatric   Weakness    Spasms    Tingling Yes   Burning Yes   Numbness    Insomnia Yes   Anxiety    Depression    Head/Eyes   Headaches    Temple pain    Dizziness    Dry eyes    Eye pain    Eye redness    Blurry vision    Vision loss    Ears/Nose   Ear pain Yes   Ringing in ears    Vertigo    Hearing loss    Nasal ulcers    Nosebleeds Yes   Sinus pain Yes   Nasal congestion Yes   Snoring    Mouth/Throat   Oral ulcers Yes   Bleeding gums    Dry mouth    Cavities    Sore throat    Sticking in throat    Difficulty speaking    Neck/Lymphatics   Thyroid pain    Thyroid swelling    Lymph node swelling    Cardiac/Respiratory   Chest pain with breathing    Dry cough    Cough with bloody phlegm    Shortness of breath    Fast heartbeats    Irregular heartbeats    Gastrointestinal   Nausea    Vomiting    Difficulty swallowing    Heartburn    Abdominal pain    Bloody stool    Mucus stool    Genitourinary   Pelvic pain    Genital ulcers    Abnormal discharge    Burning urination    Frothy urine    Blood in urine        REVIEW OF SYSTEMS:  Except as noted in the history above, relevant review of systems with emphasis on autoimmune rheumatic diseases was otherwise negative.      CURRENT PROBLEM LIST:  Patient Active Problem List    Diagnosis Date Noted    Chronic pain of right hip 01/16/2025    Primary osteoarthritis of both hands 09/25/2023    Chronic pansinusitis 03/18/2023    Immunosuppressed status (HCC) 03/18/2023    Peripheral sensory neuropathy 03/18/2023    Acute calculous  cholecystitis 04/16/2022    Granulomatosis with polyangiitis of nose (HCC) 04/16/2022       PAST MEDICAL HISTORY:  History reviewed. No pertinent past medical history.    PAST SURGICAL HISTORY:  Past Surgical History:   Procedure Laterality Date    JERAMY BY LAPAROSCOPY N/A 4/16/2022    Procedure: CHOLECYSTECTOMY, LAPAROSCOPIC;  Surgeon: Demario Lal M.D.;  Location: SURGERY [facility];  Service: General       SOCIAL HISTORY:  Social History     Socioeconomic History    Marital status:      Spouse name: Not on file    Number of children: Not on file    Years of education: Not on file    Highest education level: 11th grade   Occupational History    Not on file   Tobacco Use    Smoking status: Every Day     Current packs/day: 0.50     Average packs/day: 0.5 packs/day for 30.0 years (15.0 ttl pk-yrs)     Types: Cigarettes    Smokeless tobacco: Never   Vaping Use    Vaping status: Never Used   Substance and Sexual Activity    Alcohol use: Not Currently     Comment: occ    Drug use: Never    Sexual activity: Not on file   Other Topics Concern    Not on file   Social History Narrative    Not on file     Social Drivers of Health     Financial Resource Strain: Low Risk  (9/29/2024)    Overall Financial Resource Strain (CARDIA)     Difficulty of Paying Living Expenses: Not hard at all   Food Insecurity: No Food Insecurity (9/29/2024)    Hunger Vital Sign     Worried About Running Out of Food in the Last Year: Never true     Ran Out of Food in the Last Year: Never true   Transportation Needs: No Transportation Needs (9/29/2024)    PRAPARE - Transportation     Lack of Transportation (Medical): No     Lack of Transportation (Non-Medical): No   Physical Activity: Sufficiently Active (9/29/2024)    Exercise Vital Sign     Days of Exercise per Week: 4 days     Minutes of Exercise per Session: 120 min   Stress: No Stress Concern Present (9/29/2024)    [place] [place] [employer] - Occupational Stress  Questionnaire     Feeling of Stress : Not at all   Social Connections: Moderately Isolated (9/29/2024)    Social Connection and Isolation Panel [NHANES]     Frequency of Communication with Friends and Family: Twice a week     Frequency of Social Gatherings with Friends and Family: Twice a week     Attends Adventism Services: Never     Active Member of Clubs or Organizations: No     Attends Club or Organization Meetings: Never     Marital Status:    Intimate Partner Violence: Not on file   Housing Stability: Unknown (9/29/2024)    Housing Stability Vital Sign     Unable to Pay for Housing in the Last Year: No     Number of Times Moved in the Last Year: Not on file     Homeless in the Last Year: No       FAMILY HISTORY:  History reviewed. No pertinent family history.    MEDICATIONS:  Current Outpatient Medications   Medication Sig    meloxicam (MOBIC) 15 MG tablet Take 15 mg by mouth every day.    QUEtiapine (SEROQUEL) 25 MG Tab TAKE (1) ONE Tablet BY MOUTH EVERY EVENING    topiramate (TOPAMAX) 25 MG Tab TAKE (1) ONE Tablet BY MOUTH TWICE A DAY    Avacopan (TAVNEOS) 10 MG Cap Take 3 Tablets by mouth 2 times a day.    azaTHIOprine (IMURAN) 50 MG Tab Take 2 Tablets by mouth every day.    folic acid (FOLVITE) 1 MG Tab Take 2 Tablets by mouth every day. Except the day of methotrexate.    atorvastatin (LIPITOR) 20 MG Tab Take 20 mg by mouth every day.    cyclobenzaprine (FLEXERIL) 10 mg Tab TAKE ONE TABLET BY MOUTH EVERY EVENING AT BEDTIME    amitriptyline (ELAVIL) 10 MG Tab TAKE 1 TO 3 TABLETS BY MOUTH EVERY EVENING AT BEDTIME AS NEEDED    mupirocin (BACTROBAN) 2 % Ointment Apply 1 Application. topically 2 times a day.    zolpidem (AMBIEN) 10 MG Tab Take 10 mg by mouth at bedtime.    promethazine (PHENERGAN) 25 MG Tab Take 25 mg by mouth 1 time a day as needed for Nausea/Vomiting. Pt takes with weekly methotrexate       ALLERGIES:   Allergies   Allergen Reactions    Penicillins Vomiting     N/V, fever   Reaction  "~15 years ago       IMMUNIZATIONS:  Immunization History   Administered Date(s) Administered    Covid-19 Mrna (Spikevax) Moderna 12+ Years 10/05/2024    MODERNA SARS-COV-2 VACCINE (12+) 02/03/2021, 03/03/2021, 10/29/2021    SARS-COV-2 (COVID-19) vaccine, UNSPECIFIED 10/20/2022            Objective     Vital Signs: /64 (BP Location: Left arm, Patient Position: Sitting, BP Cuff Size: Adult)   Pulse 92   Temp 36.7 °C (98 °F) (Temporal)   Ht 1.702 m (5' 7\")   Wt 90.7 kg (200 lb)   SpO2 97% Body mass index is 31.32 kg/m².    General: Appears well and comfortable  Eyes: No scleral or conjunctival lesions  ENT: Dark crusted blood on the nasal septum/mucosa bilaterally; no apparent oral or ear lesions  Head/Neck: No apparent scalp or neck lesions  Cardiovascular: Regular rate and rhythm  Respiratory: Breathing quiet and unlabored  Gastrointestinal: No apparent organomegaly or abdominal masses  Integumentary: No significant cutaneous lesions or dyspigmentation  Musculoskeletal: Mild to moderate point tenderness over the right hip trochanteric bursa; poorly localized minimal tenderness of the hands and feet; no joint swelling, warmth, erythema, or overt synovitis; no significant restriction in range of motion of joints examined  Neurologic: No focal sensory or motor deficits  Psychiatric: Mood and affect appropriate      LABORATORY RESULTS REVIEWED AND INTERPRETED:  Lab Results   Component Value Date/Time    WBC 14.5 (H) 04/16/2022 08:25 AM    RBC 4.91 04/16/2022 08:25 AM    HEMOGLOBIN 15.7 04/16/2022 08:25 AM    HEMATOCRIT 46.9 04/16/2022 08:25 AM    MCV 95.5 04/16/2022 08:25 AM    MCH 32.0 04/16/2022 08:25 AM    MCHC 33.5 (L) 04/16/2022 08:25 AM    RDW 49.1 04/16/2022 08:25 AM    PLATELETCT 294 04/16/2022 08:25 AM    MPV 9.2 04/16/2022 08:25 AM    NEUTS 10.04 (H) 04/16/2022 08:25 AM    LYMPHOCYTES 20.90 (L) 04/16/2022 08:25 AM    MONOCYTES 7.10 04/16/2022 08:25 AM    EOSINOPHILS 1.80 04/16/2022 08:25 AM    " BASOPHILS 0.30 04/16/2022 08:25 AM     Lab Results   Component Value Date/Time    ASTSGOT 16 04/16/2022 08:25 AM    ALTSGPT 24 04/16/2022 08:25 AM    ALKPHOSPHAT 112 (H) 04/16/2022 08:25 AM    TBILIRUBIN 0.4 04/16/2022 08:25 AM    TOTPROTEIN 7.2 04/16/2022 08:25 AM    ALBUMIN 4.3 04/16/2022 08:25 AM     Lab Results   Component Value Date/Time    SODIUM 141 04/16/2022 08:25 AM    POTASSIUM 4.5 04/16/2022 08:25 AM    CHLORIDE 105 04/16/2022 08:25 AM    CO2 23 04/16/2022 08:25 AM    GLUCOSE 111 (H) 04/16/2022 08:25 AM    BUN 17 04/16/2022 08:25 AM    CREATININE 0.57 04/16/2022 08:25 AM    CALCIUM 9.6 04/16/2022 08:25 AM     Lab Results   Component Value Date/Time    COLORURINE Yellow 04/16/2022 10:04 AM    SPECGRAVITY 1.023 04/16/2022 10:04 AM    PHURINE 5.5 04/16/2022 10:04 AM    GLUCOSEUR Negative 04/16/2022 10:04 AM    KETONES Negative 04/16/2022 10:04 AM    PROTEINURIN Negative 04/16/2022 10:04 AM       RADIOLOGY RESULTS REVIEWED AND INTERPRETED:  No relevant loadable results found in the system. Pertinent non-system results, if applicable, summarized under history above.      All relevant laboratory and imaging results reported on this note were reviewed and interpreted by me.         Assessment & Plan     Celine Li is a 59 y.o. female with history and physical as noted above whose presentation merits the following clinical impressions and recommendations:    1. Granulomatosis with polyangiitis of nose (HCC)  Active problem with clinical picture of seronegative GPA with primarily nasal involvement with a fluctuating clinical course despite ongoing DMARD therapy with rituximab and azathioprine following past ENT surgical interventions. Reasonable at this point to optimize her regimen by switching from rituximab to Tavneos and continuing with azathioprine.  - CRP and ESR (previously ordered, so reprinted)  - Avacopan (TAVNEOS) 10 MG Cap; Take 3 Tablets by mouth 2 times a day.  Dispense: 60 Capsule; Refill:  5  - Continue azathioprine 100 mg daily with folic acid 2 mg daily  - Discontinue Rituxan 500 mg IV infusion every 6 months (last in 11/2024)    2. Chronic pansinusitis  Active problem that is the predominant manifestation of her GPA which sometimes has superimposed infection that is managed with long-term topical and oral antimicrobial agents. Given her immunosuppressed status, this raises some concern for microbial drug resistance for which she previously completed treatment with cefepime and meropenem.  - Continue moisturizing nasal rinses as needed  - Continue mupirocin 2% ointment twice daily as needed  - Routine follow-up with ENT    3. Chronic pain of right hip  Active problem that is clinically consistent with right trochanteric bursitis, but reasonable to get a radiograph before deciding on the most appropriate target for steroid injection.  - DX-HIP-COMPLETE - UNILATERAL 2+ RIGHT; Future  - Plan for steroid injection of right hip trochanteric bursa versus femoral acetabular joint depending on her radiograph    4. Primary osteoarthritis of both hands  Significant etiology of her biomechanical arthralgia which can be managed supportively.  - Paraffin wax hand therapy PRN as previously recommended  - Tylenol Arthritis and Voltaren 1% gel with or without oral NSAIDs as needed  - Consider intra-articular steroid injection if that becomes necessary     5. Immunosuppressed status (HCC)  High risk immunosuppressant use requiring routine monitoring labs prior to every visit to assess for possible side effects including but not limited to myelotoxicity, hepatotoxicity, and opportunistic infections.  - CBC and CMP (previously ordered, so reprinted)  - Need to ascertain age-appropriate vaccines in addition to the ones listed above under immunizations      The above assessment and plan were discussed with the patient who acknowledged understanding of the plan.    FOLLOW-UP: Return in about 3 months (around 4/16/2025)  for Short.         Thank you for the opportunity to participate in the care of Celine Li.    Arturo Marc MD, MS, FACR  Rheumatologist, [facility] Rheumatology, [facility]   of Clinical Medicine, Department of Internal Medicine  [facility]

## 2025-01-20 ENCOUNTER — PATIENT MESSAGE (OUTPATIENT)
Dept: RHEUMATOLOGY | Facility: MEDICAL CENTER | Age: 60
End: 2025-01-20
Payer: COMMERCIAL

## 2025-01-20 DIAGNOSIS — M31.30 GRANULOMATOSIS WITH POLYANGIITIS OF NOSE (HCC): ICD-10-CM

## 2025-01-20 DIAGNOSIS — D84.9 IMMUNOSUPPRESSED STATUS (HCC): ICD-10-CM

## 2025-01-20 RX ORDER — FOLIC ACID 1 MG/1
2 TABLET ORAL DAILY
Qty: 180 TABLET | Refills: 3 | Status: SHIPPED | OUTPATIENT
Start: 2025-01-20

## 2025-01-20 RX ORDER — FOLIC ACID 1 MG/1
2 TABLET ORAL DAILY
Qty: 180 TABLET | Refills: 3 | Status: CANCELLED
Start: 2025-01-20

## 2025-01-20 RX ORDER — AZATHIOPRINE 50 MG/1
100 TABLET ORAL DAILY
Qty: 180 TABLET | Refills: 3 | Status: CANCELLED
Start: 2025-01-20

## 2025-01-20 RX ORDER — AZATHIOPRINE 50 MG/1
100 TABLET ORAL DAILY
Qty: 180 TABLET | Refills: 3 | Status: SHIPPED | OUTPATIENT
Start: 2025-01-20

## 2025-01-20 ASSESSMENT — RHEUMATOLOGY FOLLOW-UP QUESTIONNAIRE
CHARACTERISTIC: WORSE WITH ACTIVITY
RATE_1TO10: 8
CHIEF_COMPLAINT: RIGHT HIP PAIN
JOINT PAIN: WORSE WITH ACTIVITY
JOINT INSTABILITY: Y
DURATION: >1 HOUR
MARK ALL THE AREAS OF PAIN: 113294891

## 2025-01-20 NOTE — PATIENT COMMUNICATION
Received request via: Patient Patient's pharmacy closed, requesting rx's to go to new pharmacy    Was the patient seen in the last year in this department? Yes    Does the patient have an active prescription (recently filled or refills available) for medication(s) requested? Yes.     Pharmacy Name: [facility]    Does the patient have residential Plus and need 100-day supply? (This applies to ALL medications) Patient does not have SCP

## 2025-01-21 NOTE — TELEPHONE ENCOUNTER
Called insurance at: [phone] and spoke with AJ farias Commercial Review Associate and he said the case is still pending review. We should hear a response by the end of this week.    Case# [id number]

## 2025-01-22 ENCOUNTER — OFFICE VISIT (OUTPATIENT)
Dept: RHEUMATOLOGY | Facility: MEDICAL CENTER | Age: 60
End: 2025-01-22
Attending: STUDENT IN AN ORGANIZED HEALTH CARE EDUCATION/TRAINING PROGRAM
Payer: COMMERCIAL

## 2025-01-22 VITALS
SYSTOLIC BLOOD PRESSURE: 106 MMHG | HEART RATE: 80 BPM | HEIGHT: 67 IN | OXYGEN SATURATION: 95 % | BODY MASS INDEX: 31.39 KG/M2 | TEMPERATURE: 98.1 F | DIASTOLIC BLOOD PRESSURE: 62 MMHG | WEIGHT: 200 LBS

## 2025-01-22 DIAGNOSIS — M70.61 TROCHANTERIC BURSITIS OF RIGHT HIP: ICD-10-CM

## 2025-01-22 PROCEDURE — 3078F DIAST BP <80 MM HG: CPT | Performed by: STUDENT IN AN ORGANIZED HEALTH CARE EDUCATION/TRAINING PROGRAM

## 2025-01-22 PROCEDURE — 3074F SYST BP LT 130 MM HG: CPT | Performed by: STUDENT IN AN ORGANIZED HEALTH CARE EDUCATION/TRAINING PROGRAM

## 2025-01-22 PROCEDURE — 700101 HCHG RX REV CODE 250: Performed by: STUDENT IN AN ORGANIZED HEALTH CARE EDUCATION/TRAINING PROGRAM

## 2025-01-22 PROCEDURE — 20610 DRAIN/INJ JOINT/BURSA W/O US: CPT | Mod: RT | Performed by: STUDENT IN AN ORGANIZED HEALTH CARE EDUCATION/TRAINING PROGRAM

## 2025-01-22 PROCEDURE — 700111 HCHG RX REV CODE 636 W/ 250 OVERRIDE (IP): Performed by: STUDENT IN AN ORGANIZED HEALTH CARE EDUCATION/TRAINING PROGRAM

## 2025-01-22 PROCEDURE — 20610 DRAIN/INJ JOINT/BURSA W/O US: CPT | Performed by: STUDENT IN AN ORGANIZED HEALTH CARE EDUCATION/TRAINING PROGRAM

## 2025-01-22 RX ORDER — METHYLPREDNISOLONE ACETATE 40 MG/ML
40 INJECTION, SUSPENSION INTRA-ARTICULAR; INTRALESIONAL; INTRAMUSCULAR; SOFT TISSUE ONCE
Status: COMPLETED | OUTPATIENT
Start: 2025-01-22 | End: 2025-01-22

## 2025-01-22 RX ADMIN — METHYLPREDNISOLONE ACETATE 40 MG: 40 INJECTION, SUSPENSION INTRA-ARTICULAR; INTRALESIONAL; INTRAMUSCULAR; SOFT TISSUE at 13:32

## 2025-01-22 RX ADMIN — LIDOCAINE HYDROCHLORIDE 2 ML: 10 INJECTION, SOLUTION EPIDURAL; INFILTRATION; INTRACAUDAL; PERINEURAL at 13:33

## 2025-01-22 NOTE — TELEPHONE ENCOUNTER
Prior Authorization for Tavneos 10MG capsules  has been denied for a quantity of 180 , day supply 30    Prior authorization was denied per the following: This authorization does not meet the criteria for approval. 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 [facility] does not cover this drug or any other indications.     Prior Authorization denial reference number: [id number]  Insurance: R[employer]u    I had to call insurance for the denial information because I have not received a fax and the representative told me that she was unable to fax me the denial.     These were the only questions asked for the PA and chart notes were attached.

## 2025-01-22 NOTE — PROCEDURES
"    [facility] RHEUMATOLOGY  [address], [address], [place] [address]  Phone: [phone] ? Fax: [phone]  [facility]/Health-Services/Rheumatology    PROCEDURE NOTE      DATE OF SERVICE: 01/22/2025         YOB: 1965    MEDICAL RECORD NUMBER: [id number]    CHIEF COMPLAINT:   Chief Complaint   Patient presents with    Injections     Chronic pain of right hip       VITAL SIGNS: /62 (BP Location: Left arm, Patient Position: Sitting, BP Cuff Size: Large adult)   Pulse 80   Temp 36.7 °C (98.1 °F) (Temporal)   Ht 1.702 m (5' 7\")   Wt 90.7 kg (200 lb)   SpO2 95% Body mass index is 31.32 kg/m².         Procedure Performed: Intrabursal steroid/anesthetic injection    Clinical Indication: Trochanteric bursitis of right hip    Informed Consent: Verbal informed consent was obtained from the patient after discussion of the risks, benefits, and alternatives to the procedure.    Pre-Procedure Checklist/Time-Out: Standard pre-procedure checklist and time-out were performed and the required elements of the procedure were reviewed with the patient.    Procedure Technique Description: Skin overlying the bursa surface was prepped by cleaning and disinfecting with ChloraPrep antiseptic. Local anesthesia was achieved by using ethyl chloride topical anesthetic spray. An 18-gauge 1.5-inch needle and a 5-cc syringe were used to withdraw and mix methylprednisolone 1 mL and lidocaine 2 mL. A 25-gauge 1.5-inch needle and the 5-cc syringe containing the methylprednisolone and lidocaine mix were used for the injection. The entire procedure was performed under standard aseptic techniques.    Medications Used: Methylprednisolone 40 mg / 1 ml (NDC 9030-0063-66, LOT [id number], EXP 05/31/2026) from single use vials, and Lidocaine 1% / 2 ml (NDC 33137-524-16, LOT [id number], EXP 11/30/2025) from single use vials.    Outcome/Complications: Patient tolerated the procedure well and no complications were observed.         DIAGNOSES " & ORDERS:  1. Trochanteric bursitis of right hip  - Large Joint Injection / Arthrocentesis  - methylPREDNISolone acetate (Depo-Medrol) injection 40 mg  - lidocaine (Xylocaine) 1 % injection 2 mL      FOLLOW-UP: Return if symptoms worsen or fail to improve.         Arturo Marc MD, MS, FACR  Rheumatologist, [facility] Rheumatology, [facility]   of Clinical Medicine, Department of Internal Medicine  [facility]

## 2025-01-23 NOTE — TELEPHONE ENCOUNTER
I resubmitted the PA again to see if we get a different outcome.     Prior Authorization for Tavneos 10MG capsules  (Quantity: 180, Days: 30) has been submitted via Cover My Meds: Key ([id number])    Insurance: R[employer]u    Will follow up in 24-48 business hours.

## 2025-01-24 NOTE — TELEPHONE ENCOUNTER
The new PA was denied again. A peer to peer can be done by calling [phone] which is the prior authorization department.     Damaris, when Dr. Marc is ready to do the peer to peer can you call to set it up?    Please let me know the outcome so I can send the prescription to the appropriate pharmacy if approved.    Thanks!

## 2025-01-24 NOTE — TELEPHONE ENCOUNTER
[facility]  HOSPITAL MEDICINE PROGRESS NOTE   Patient: Junito Burch  Today's Date: 8/25/2022    YOB: 1968  Admission Date: 6/13/2022    MRN: [id number]  Inpatient LOS: 73    Room: L44/A  Hospital Day: Hospital Day: 74    Subjective   HISTORY AND SUBJECTIVE COMPLAINTS     Chief Complaint:   Stroke     Interval History / Subjective:    No acute issues overnight. No more fever and BP stable after IV antibiotics started.  Patient calm today but still having intermittent impulsiveness and agitation.  He denies pain or shortness of breath.  The plan is to discharge him to [facility] once placement found.     Hospital Course:  Junito Burch is a 54 year old man with history of HTN, HLD, hypothyroidism,Bell's Palsy who presented to the ED on 6/13/2022 for evaluation of right sided weakness and confusion via EMS after being found by a roommate.     Per EMS, patient was aphasic with R sided weakness. Initial NIHSS 11 with L gaze,L hemiparesis, and aphasia. CT head concerning for evolving left temporal-occipital evolving stroke with 3 mm rightward shift. CTA with moderate narrowing of left M1 and proximal left M2 without focal occlusion. TPA deferred (out of window).Continue DAPT per SAMPRISS protocol (90 days, followed by aspirin monotherapy) .    TTE notable for incidental cardiomyopathy with reduced ejection fraction of 35%. Cardiology consulted.  Plan for ischemic evaluation as outpatient. Timing of LHC TBD by cardiology. Cleared by neurology to proceed with [facility] given stable repeat CT head. Urinary retention and hematuria noted this admission.  Urology consulted. No urgent plan for cystoscopy as hematuria is improving with hand irrigation.     Hospital course complicated by episodes of increased agitation and irritability, partly due to not being able to vocalize needs. He has been intermittently refusing all medication since 7/27. Psych consulted on 7/28 and recommended Depakote. Waiting for  Sounds good, Dr. Marc. Hopefully I'll have another response from insurance from the new prior auth I submitted by the beginning of next week. If they deny again, I will inquire about a peer to peer.    Thanks!   guardianship court date and placement    ROS:  Done, limited due to aphasia    Objective   PHYSICAL EXAMINATION     Vital 24 Hour Range Most Recent Value   Temperature Temp  Min: 98 °F (36.7 °C)  Max: 98.5 °F (36.9 °C) 98.1 °F (36.7 °C)   Pulse Pulse  Min: 66  Max: 88 88   Respiratory Resp  Min: 16  Max: 18 16   Blood Pressure BP  Min: 94/59  Max: 130/72 130/72   Pulse Oximetry SpO2  Min: 95 %  Max: 96 % 96 %   Arterial BP No data recorded     O2 No data recorded       Recorded Intake and Output:    Intake/Output Summary (Last 24 hours) at 8/25/2022 0856  Last data filed at 8/24/2022 1905  Gross per 24 hour   Intake 360 ml   Output --   Net 360 ml      Recorded Last Stool Occurrence: 1 (08/24/22 2000)     Vital Most Recent Value First Value   Weight 85.2 kg (187 lb 13.3 oz) Weight: 87.3 kg (192 lb 7.4 oz)   Height 5' 9\" (175.3 cm) Height: 5' 9\" (175.3 cm)   BMI 27.74 N/A   General:  No acute distress  Chest:  Clear to auscultation  CVS:  S1 and S2 well heard with no murmur or gallop  Ext: edema absent  Bruise on his right thigh and knee, improving  Skin: no rashes, lesions, or ulcers noted  Neuro:  Awake but confused. Right-sided weakness.  Decreased vision/blindness.    TEST RESULTS     Labs: The Laboratory values listed below have been reviewed and pertinent findings discussed in the Assessment and Plan.    Laboratory values:   Recent Labs   Lab 08/25/22  0529 08/24/22  0452 08/23/22  0511   WBC 10.1 11.9* 17.4*   HGB 10.8* 11.1* 10.7*   HCT 33.2* 33.7* 32.5*    240 226         Recent Labs   Lab 08/25/22  0529 08/24/22  0452 08/23/22  0511   SODIUM 140 139 135   POTASSIUM 3.5 3.4 3.5   CHLORIDE 110 108 104   CO2 22 25 25   CALCIUM 8.1* 8.2* 7.7*   GLUCOSE 83 99 88   BUN 13 15 19   CREATININE 0.90 0.96 1.22*          Radiology: Imaging studies have been reviewed and pertinent findings discussed in the Assessment and Plan.  No results found for any visits on 06/13/22 (from the past 48 hour(s)).     ANCILLARY  ORDERS     Diet:  No Straws -  Note Continuous  Standard Tray From Dietary(no Room Svc) Continuous  Nursing To Pass Tray - Set Up Tray  Regular; no Milk On Tray Diet  Telemetry: Off  Consults:    IP CONSULT TO NEUROLOGY  IP CONSULT TO PHYSICAL MEDICINE & REHAB  IP CONSULT TO CARDIOLOGY  IP CONSULT TO ENDOCRINOLOGY  IP CONSULT TO UROLOGY  IP CONSULT TO UROLOGY  IP CONSULT TO RN WOUND CARE  IP CONSULT TO NUTRITION SERVICES  IP CONSULT TO ELECTROPHYSIOLOGY  IP CONSULT TO PSYCHOLOGY  IP CONSULT TO PHYSICAL MEDICINE & REHAB  IP CONSULT TO PSYCHIATRY  Therapy Orders:   PT and OT Orders Placed this Encounter   Procedures   • Occupational Therapy   • Occupational Therapy   • Physical Therapy   • Physical Therapy       ADVANCED DIRECTIVES     Code Status: Full Resuscitation         ASSESSMENT AND PLAN     Acute left MCA stroke with 3 mm rightward shift/focal left M1-M2 stenosis with diffuse intracranial atherosclerosis.   -Deficits: dysarthric, right hemiparesis.  Continue with aspirin, atorvastatin, Plavix. Therapies   -Temporary guardianship 8/3. Permanent hearing 8/31. SW to start looking for placement  -IRP denied given need for guardianship and no safe discharge plan    Increased agitation and restlessness (7/27)  -patient continued to be agitated intermittently  -XRay of knee after fall, negative for acute fracture.  -Delirium precautions  -Psych consulted 7/29: Started on Depakote 250 mg p.o. b.i.d..(intermittently refused), may need this to be increased.  -Seroquel 25 mg po nightly PRN (was ordered scheduled before but became lethargic after taking it with Depakote)     Heart failure with reduced ejection fraction, newly diagnosed.    -Clinically compensated.  He is on lisinopril, Aldactone and Coreg.    -Will need ischemic evaluation at some point      Hypothyroidism  -Levothyroxine     Essential hypertension  Dyslipidemia   - bp borderline   - Has been intermittently refusing medications.    Urinary  retention  -He had Ellington catheter previously which was subsequently removed per Urology.  Patient has been requiring regular straight catheterization.    -Urology recommended against replacing Ellington catheter    Blindness since 2018    Tobacco use    Acute E.Coli UTI with sepsis but no severe sepsis  Pt developed leukocytosis, chills and vomiting 8/22/22  No fever   BP improved, no more tachy and leukocytosis resolved   CXR unremarkable  UA suggestive of UTI. C&S pansensitive E.Coli. Will treat with antibiotics for 5 days.   Give SPA for hypotension given his low LVEF    Smoking status: current smoker    Nutrition status: severe protein calorie malnutrition  Body mass index is 27.74 kg/m². - Overweight BMI 25-29  DVT Prophylaxis: Lovenox prophylactic dosing (dose adjusted as per renal function)         DISCHARGE PLANNING     The patient's treatment plans were discussed with patient and RN.     Recommendations for Discharge   SW     PT Post-acute facility with therapy needs   OT Post-acute facility with therapy needs (brain injury unit vs [facility] with home therapy)   SLP        Anticipated discharge destination: Skilled Nursing Facility SNF  Expected Discharge Date:  TBD  Barriers to Discharge: Pending Identification of a bed at an appropriate post-acute facility and/or Insurance authorization for the post-acute bed.        Manuel Mauricio MD  Hospitalist  8/25/2022  8:56 AM

## 2025-01-24 NOTE — TELEPHONE ENCOUNTER
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 She is getting switched to Tavneos which is specifically FDA approved for this indication, so should be approved. If they do not approve it, find out their availability for a [id number] review on a Thursday afternoon or Friday anytime and I will be happy to oblige to educate them on this.    Arturo Marc M.D.

## 2025-01-27 NOTE — TELEPHONE ENCOUNTER
I contacted [employer] through [phone], the automated phone system did not allow me to reach the correct department to schedule Peer to Peer Review. The automated system provided me with the denial information and advised to follow denial steps.  Will attempt to call [employer] again in hopes to speak with actual representative.

## 2025-01-27 NOTE — TELEPHONE ENCOUNTER
I finally received a denial letter and uploaded it in the patient's media if you'd like to refer to it during a peer to peer.

## 2025-01-27 NOTE — TELEPHONE ENCOUNTER
When I spoke with [employer] they provided me with this number to call to set up peer to peer: [phone].

## 2025-01-28 NOTE — TELEPHONE ENCOUNTER
Peer to peer scheduled at 1pm on Thursday, 01/30 with Miguelangel Yancey [facility]. They will call Dr. Marc's cell phone.

## 2025-01-30 NOTE — TELEPHONE ENCOUNTER
Peer to Peer Review completed by  and pharmacist Miguelangel Yancey. Pharmacist advised me to call [employer] to begin appeals process.  Per prior authorization representative Enrrique GOLDSTEIN, an appeal cannot be submitted due to pending consideration of the Peer to Peer Review.     Reference # [id number]

## 2025-04-13 ASSESSMENT — RHEUMATOLOGY FOLLOW-UP QUESTIONNAIRE
NOSEBLEEDS: Y
SINUS PAIN: Y
ORAL ULCERS: Y
BURNING: Y
TENDON PAIN: Y
INSOMNIA: Y
CHARACTERISTIC: SAME WITH ACTIVITY
FATIGUE: Y
MARK ALL THE AREAS OF PAIN: 117851410
NASAL CONGESTION: Y
TINGLING: Y
JOINT SWELLING: Y
DURATION: >1 HOUR
NUMBNESS: Y
RATE_1TO10: 5
STICKING IN THROAT: Y

## 2025-04-16 ENCOUNTER — OFFICE VISIT (OUTPATIENT)
Dept: RHEUMATOLOGY | Facility: MEDICAL CENTER | Age: 60
End: 2025-04-16
Attending: STUDENT IN AN ORGANIZED HEALTH CARE EDUCATION/TRAINING PROGRAM
Payer: COMMERCIAL

## 2025-04-16 VITALS
HEART RATE: 84 BPM | TEMPERATURE: 97.2 F | DIASTOLIC BLOOD PRESSURE: 60 MMHG | BODY MASS INDEX: 30.13 KG/M2 | RESPIRATION RATE: 16 BRPM | HEIGHT: 67 IN | OXYGEN SATURATION: 97 % | SYSTOLIC BLOOD PRESSURE: 100 MMHG | WEIGHT: 192 LBS

## 2025-04-16 DIAGNOSIS — D84.9 IMMUNOSUPPRESSED STATUS (HCC): ICD-10-CM

## 2025-04-16 DIAGNOSIS — M19.041 PRIMARY OSTEOARTHRITIS OF BOTH HANDS: ICD-10-CM

## 2025-04-16 DIAGNOSIS — M31.30 GRANULOMATOSIS WITH POLYANGIITIS OF NOSE (HCC): ICD-10-CM

## 2025-04-16 DIAGNOSIS — G60.8 PERIPHERAL SENSORY NEUROPATHY: ICD-10-CM

## 2025-04-16 DIAGNOSIS — M19.042 PRIMARY OSTEOARTHRITIS OF BOTH HANDS: ICD-10-CM

## 2025-04-16 DIAGNOSIS — J32.4 CHRONIC PANSINUSITIS: ICD-10-CM

## 2025-04-16 PROBLEM — I77.82: Status: ACTIVE | Noted: 2025-04-16

## 2025-04-16 PROCEDURE — 3074F SYST BP LT 130 MM HG: CPT | Performed by: STUDENT IN AN ORGANIZED HEALTH CARE EDUCATION/TRAINING PROGRAM

## 2025-04-16 PROCEDURE — 99214 OFFICE O/P EST MOD 30 MIN: CPT | Performed by: STUDENT IN AN ORGANIZED HEALTH CARE EDUCATION/TRAINING PROGRAM

## 2025-04-16 PROCEDURE — 3078F DIAST BP <80 MM HG: CPT | Performed by: STUDENT IN AN ORGANIZED HEALTH CARE EDUCATION/TRAINING PROGRAM

## 2025-04-16 PROCEDURE — 99212 OFFICE O/P EST SF 10 MIN: CPT | Performed by: STUDENT IN AN ORGANIZED HEALTH CARE EDUCATION/TRAINING PROGRAM

## 2025-04-16 RX ORDER — MYCOPHENOLATE MOFETIL 500 MG/1
1000 TABLET ORAL 2 TIMES DAILY
Qty: 120 TABLET | Refills: 5 | Status: SHIPPED | OUTPATIENT
Start: 2025-04-16

## 2025-04-16 RX ORDER — GABAPENTIN 300 MG/1
300 CAPSULE ORAL 2 TIMES DAILY
Qty: 60 CAPSULE | Refills: 5 | Status: SHIPPED | OUTPATIENT
Start: 2025-04-16

## 2025-04-16 RX ORDER — RITUXIMAB 10 MG/ML
INJECTION, SOLUTION INTRAVENOUS
COMMUNITY

## 2025-04-16 NOTE — PATIENT INSTRUCTIONS
[facility]    Below are important guidelines to help you navigate your health care needs and assist us in caring for you safely and effectively. We encourage you to carefully read and understand this information and adhere to them accordingly.    Refund Exchange Messaging and Phone Calls:  Diagnosis and Treatment - For a detailed explanation of your condition and treatment plan from today's visit, refer to the visit note on Ref[employer] Exchange via the following steps:  Log in to Refund Exchange and click on “Visits” at the top.  Scroll down to “Past Visits” under Appointments.  Click on “View Notes” under the appropriate visit date.  Questions or Concerns - MyChart messaging is for non-urgent matters that do not require immediate attention and should be brief with no more than two questions or concerns. If you have multiple questions or concerns, we ask that you schedule an appointment to have them properly addressed.  Response to Messages - Refund Exchange messages are addressed throughout the week depending on clinical availability, so we ask that you allow up to one week for a response.  Phone Calls and Voicemails - Phone calls and voicemail messages are reserved for time-sensitive matters that cannot wait to be addressed via Refund Exchange. We ask that you refrain from calling the office multiple times or leaving multiple voicemails regarding the same issue as doing so may lead to delays in response time.  Urgent Issues - For urgent medical matters or medical emergencies that cannot wait, you are advised to go to your nearest Urgent Care or Emergency Department for immediate attention.    Laboratory Tests and Imaging Studies:  Future Lab and Imaging Orders - We ask that you get your lab tests and imaging studies done no later than one week before your follow-up visit unless instructed otherwise.  Results Communication - You may see some test results marked as “abnormal” that are not necessarily significant or concerning. If  there are significant abnormalities on your test results that warrant further action, you will be notified via MyChart or phone call, otherwise they will be addressed at your follow-up visit.    Prescriptions and Refill Requests:  General Prescriptions (e.g. prednisone, hydroxychloroquine, leflunomide, methotrexate, etc.) - These are sent to Retail Pharmacies, so all refill requests of these medications should be directed to your local pharmacy.  Specialty Prescriptions (e.g. Enbrel, Humira, Cosentyx, Xeljanz, etc.) - These are sent to Specialty Pharmacies, so all refill requests of these medications should be directed to your designated specialty pharmacy.  Infusion Prescriptions (e.g. Remicade, Simponi Aria, Rituxan, Saphnelo, etc.) - These are sent to Outpatient Infusion Centers, so all scheduling requests of these medications should be directed to your local infusion center.    Medication Risks and Adverse Effects:  Immunosuppressed Status - Steroids and antirheumatic drugs are immunosuppressants, so they increase the risk of infections and can have side effects on various organ systems in your body, though most of them are uncommon.  Potential Side Effects - Be sure to read the drug package inserts to learn about the potential side effects of your medications before you start taking them and take them exactly as prescribed unless instructed otherwise.  In Case of Side Effects - If you experience any significant side effects, stop taking the medication immediately and promptly notify the prescriber. Depending on the severity of the side effects, consider going to an Urgent Care or Emergency Department for immediate attention.    Immunizations and Health Screening:  Vaccinations - If you are on immunosuppressive therapy, it is important that you are up to date on age-appropriate immunizations, particularly shingles and pneumonia vaccines, which you can request from your primary care provider or from us at your  next appointment.  Screening Tests - It is also important that you are up to date on age-appropriate screening tests, such as pap smear, mammography, and colonoscopy, which you can request from your primary care provider.    Educational and Supportive Resources:  [employer] [facility] ([email or web address].org/Health-Services/Rheumatology) - Visit our website to learn more about your condition and other rheumatic diseases, and gain access to many helpful resources for them.  Disposal of Old Medications (www.[place].gov/everyday-takeback-day) - Visit the Drug Enforcement Administration website to find a nearby location where you can properly dispose of old medications you no longer need.  Disposal of Used [place] (www.safeneedledisposal.org) - Visit the Safe Needle Disposal Organization website to find a nearby location where you can properly dispose of used needles from your injectable medications.

## 2025-04-16 NOTE — PROGRESS NOTES
[facility] RHEUMATOLOGY  [address] [facility] [address], [place] [address]  Phone: [phone] ? Fax: [phone]  [facility]/Health-Services/Rheumatology    FOLLOW-UP VISIT NOTE         Subjective     DATE OF SERVICE: 04/16/2025    PRIMARY CARE PROVIDER:  Biju iRvera M.D.  [address]  [place] [address]    PATIENT IDENTIFICATION:  Celine Li  [address] [place] [address]    YOB: 1965    MEDICAL RECORD NUMBER: [id number]         CHIEF COMPLAINT:   Chief Complaint   Patient presents with    Follow-Up     Granulomatosis with polyangiitis of nose (HCC)     RHEUMATOLOGIC HISTORY:  Celine Li is a 59 y.o. 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 Previously under the care of [facility] in [place], she initially presented on 3/17/23 to establish care for continued evaluation and management of her GPA. Reported onset of GPA symptoms in 3/2020 with worsening of her chronic pansinusitis leading to nasal septum damage with bloody discharge, fluid-filled sinuses, facial pressure, and ear pressure with vertigo all of which led to her eventual diagnosis of GPA. Reported that though these had improved with treatment since diagnosis, they had been waxing/waning over time with variable degrees of mild to moderate symptoms. She additionally reported history of  photosensitive rash (on face, neck, chest, and arms), cold-induced color changes of fingers, and tingling/numbness of feet.     Pertinent treatments: Prednisone high-to-low dose (effective but caused suicidal ideation), mupirocin 2% ointment PRN (3/2022-present), Bactrim DS 3 times weekly (3/2022-present), methotrexate 15>10 mg oral weekly with folic acid 1 mg daily (started 1/2021, presumably caused elevated liver enzymes, dose decreased 2/2023-12/2023, became less effective), Rituxan 500 mg IV every 6 months (1/2021-11/2024, unclear benefit), azathioprine 50>75>100 mg oral daily (ordered 12/26/23-present, unclear benefit), Tavneos 30 mg BID (ordered 1/16/25, unable to initiate due to insurance issues), mycophenolate 1000mg BID (ordered 4/16/25-present).     Pertinent positive labs: Undetectable B-cells CD19+/CD20+ expression at 99% viability (in 9/2024 at [facility]).     Pertinent negative labs: Negative SHAN, ANCA, anti-MPO, anti-PR3, C3, C4, and CH50 (in 3/2023), TPMT 28.9 (in 12/2023), CRP, ESR, eGFR, creatinine, LFTs, and acceptable CBC (in 9/2024 at [facility]). Negative CBC, CMP, ESR (4/2025 at [facility]).     Pertinent imaging: Xray right hip (1/2025) with minimal degenerative change.    INTERVAL HISTORY:  Reports interval history as noted on the questionnaire below or scanned under media tab.  Notes feeling the same from ENT standpoint with persistent nosebleeds and phlegm production as well as occasional nasal/sinus pain. Does report that she has historically had some benefit with rituximab infusions. Also notes new burning sensation as well as numbness/tingling for the last 2 weeks on dorsal surfaces of hands bilaterally, and notes a history of neuropathy in bilateral feet.   [facility] Rheumatology Established Patient History Form    4/13/2025  5:44 AM PDT - Filed by Patient   MAIN REASON FOR VISIT follow up on GPA   INTERVAL HISTORY OF ILLNESS   Date of worsening onset:    Preceding incident/ailment:    Describe/list your  symptoms: nasal bleeding, sinus pressure and constant congestion with thick mucous.  Mouth ulcers for 2 months.   Exacerbating factors:    Alleviating factors: daily nasal flushing   Helpful medications: none at this time.  Rituxin has been the only relief but only last for a few months after taking it.   Ineffective medications:    Severity of pain (scale of 1-10): 5   Personal/emotional stressors:    Luke All The Areas Of Pain    REVIEW OF SYMPTOMS    General   Fevers    Chills    Night sweats    Malaise    Fatigue Yes   Unintentional weight loss    Musculoskeletal   Joint pain    Morning stiffness duration >1 hour   Morning stiffness characteristic Same with activity   Joint swelling Yes   Joint instability    Tendon pain Yes   Muscle pain    Body aches    Dermatologic   Hair loss with bald spots    Hair shedding    Skin thickening    Skin plaques    Sunlight-induced skin rash    Cold-induced color changes (white, purple, red on rewarming)    Neurologic/Psychiatric   Weakness    Spasms    Tingling Yes   Burning Yes   Numbness Yes   Insomnia Yes   Anxiety    Depression    Head/Eyes   Headaches    Temple pain    Dizziness    Dry eyes    Eye pain    Eye redness    Blurry vision    Vision loss    Ears/Nose   Ear pain    Ringing in ears    Vertigo    Hearing loss    Nasal ulcers    Nosebleeds Yes   Sinus pain Yes   Nasal congestion Yes   Snoring    Mouth/Throat   Oral ulcers Yes   Bleeding gums    Dry mouth    Cavities    Sore throat    Sticking in throat Yes   Difficulty speaking    Neck/Lymphatics   Thyroid pain    Thyroid swelling    Lymph node swelling    Cardiac/Respiratory   Chest pain with breathing    Dry cough    Cough with bloody phlegm    Shortness of breath    Fast heartbeats    Irregular heartbeats    Gastrointestinal   Nausea    Vomiting    Difficulty swallowing    Heartburn    Abdominal pain    Bloody stool    Mucus stool    Genitourinary   Pelvic pain    Genital ulcers    Abnormal discharge    Burning  urination    Frothy urine    Blood in urine      REVIEW OF SYSTEMS:  Except as noted in the history above, relevant review of systems with emphasis on autoimmune rheumatic diseases was otherwise negative.    CURRENT PROBLEM LIST:  Patient Active Problem List    Diagnosis Date Noted    ANCA-negative vasculitis (HCC) 04/16/2025    Trochanteric bursitis of right hip 01/22/2025    Chronic pain of right hip 01/16/2025    Primary osteoarthritis of both hands 09/25/2023    Chronic pansinusitis 03/18/2023    Immunosuppressed status ([facility]) 03/18/2023    Peripheral sensory neuropathy of hands and feet 03/18/2023    Acute calculous cholecystitis 04/16/2022    Granulomatosis with polyangiitis of nose (HCC) 04/16/2022     PAST MEDICAL HISTORY:  History reviewed. No pertinent past medical history.    PAST SURGICAL HISTORY:  Past Surgical History:   Procedure Laterality Date    PROCEDURE LARGE JOINT INJECTION/ARTHROCENTESIS  1/22/2025    JERAMY BY LAPAROSCOPY N/A 4/16/2022    Procedure: CHOLECYSTECTOMY, LAPAROSCOPIC;  Surgeon: Demario Lal M.D.;  Location: SURGERY [facility];  Service: General     SOCIAL HISTORY:  Social History     Socioeconomic History    Marital status:      Spouse name: Not on file    Number of children: Not on file    Years of education: Not on file    Highest education level: 11th grade   Occupational History    Not on file   Tobacco Use    Smoking status: Every Day     Current packs/day: 0.50     Average packs/day: 0.5 packs/day for 30.0 years (15.0 ttl pk-yrs)     Types: Cigarettes    Smokeless tobacco: Never   Vaping Use    Vaping status: Never Used   Substance and Sexual Activity    Alcohol use: Not Currently     Comment: occ    Drug use: Never    Sexual activity: Not on file   Other Topics Concern    Not on file   Social History Narrative    Not on file     Social Drivers of Health     Financial Resource Strain: Low Risk  (9/29/2024)    Overall Financial Resource Strain (CARDIA)      Difficulty of Paying Living Expenses: Not hard at all   Food Insecurity: No Food Insecurity (9/29/2024)    Hunger Vital Sign     Worried About Running Out of Food in the Last Year: Never true     Ran Out of Food in the Last Year: Never true   Transportation Needs: No Transportation Needs (9/29/2024)    PRAPARE - Transportation     Lack of Transportation (Medical): No     Lack of Transportation (Non-Medical): No   Physical Activity: Sufficiently Active (9/29/2024)    Exercise Vital Sign     Days of Exercise per Week: 4 days     Minutes of Exercise per Session: 120 min   Stress: No Stress Concern Present (9/29/2024)    [employer] [place] [employer] - Occupational Stress Questionnaire     Feeling of Stress : Not at all   Social Connections: Moderately Isolated (9/29/2024)    Social Connection and Isolation Panel [NHANES]     Frequency of Communication with Friends and Family: Twice a week     Frequency of Social Gatherings with Friends and Family: Twice a week     Attends Gnosticist Services: Never     Active Member of Clubs or Organizations: No     Attends Club or Organization Meetings: Never     Marital Status:    Intimate Partner Violence: Not on file   Housing Stability: Unknown (9/29/2024)    Housing Stability Vital Sign     Unable to Pay for Housing in the Last Year: No     Number of Times Moved in the Last Year: Not on file     Homeless in the Last Year: No     FAMILY HISTORY:  History reviewed. No pertinent family history.    MEDICATIONS:  Current Outpatient Medications   Medication Sig    riTUXimab (RITUXAN) 100 MG/10ML Solution injection 500mg every 6 months    mycophenolate (CELLCEPT) 500 MG tablet Take 2 Tablets by mouth 2 times a day.    gabapentin (NEURONTIN) 300 MG Cap Take 1 Capsule by mouth 2 times a day. or take 2 Capsules once nightly.    folic acid (FOLVITE) 1 MG Tab Take 2 Tablets by mouth every day.    meloxicam (MOBIC) 15 MG tablet Take 15 mg by mouth every day.    QUEtiapine  "(SEROQUEL) 25 MG Tab TAKE (1) ONE Tablet BY MOUTH EVERY EVENING    topiramate (TOPAMAX) 25 MG Tab TAKE (1) ONE Tablet BY MOUTH TWICE A DAY    atorvastatin (LIPITOR) 20 MG Tab Take 20 mg by mouth every day.    cyclobenzaprine (FLEXERIL) 10 mg Tab TAKE ONE TABLET BY MOUTH EVERY EVENING AT BEDTIME    amitriptyline (ELAVIL) 10 MG Tab TAKE 1 TO 3 TABLETS BY MOUTH EVERY EVENING AT BEDTIME AS NEEDED    mupirocin (BACTROBAN) 2 % Ointment Apply 1 Application. topically 2 times a day.    zolpidem (AMBIEN) 10 MG Tab Take 10 mg by mouth at bedtime.    promethazine (PHENERGAN) 25 MG Tab Take 25 mg by mouth 1 time a day as needed for Nausea/Vomiting. Pt takes with weekly methotrexate     ALLERGIES:   Allergies   Allergen Reactions    Penicillins Vomiting     N/V, fever   Reaction ~15 years ago     IMMUNIZATIONS:  Immunization History   Administered Date(s) Administered    Covid-19 Mrna (Spikevax) Moderna 12+ Years 10/05/2024    MODERNA SARS-COV-2 VACCINE (12+) 02/03/2021, 03/03/2021, 10/29/2021    SARS-COV-2 (COVID-19) vaccine, UNSPECIFIED 10/20/2022          Objective     Vital Signs: /60   Pulse 84   Temp 36.2 °C (97.2 °F) (Temporal)   Resp 16   Ht 1.702 m (5' 7\")   Wt 87.1 kg (192 lb)   SpO2 97% Body mass index is 30.07 kg/m².    General: Appears well and comfortable  Eyes: No scleral or conjunctival lesions  ENT: Very mild speckled blood noted in bilateral nares more so on the right. No nasal tenderness or sinus tenderness to palpation.   Head/Neck: No apparent scalp or neck lesions  Cardiovascular: Regular rate and rhythm  Respiratory: Breathing quiet and unlabored  Gastrointestinal: No apparent organomegaly or abdominal masses  Integumentary: No significant cutaneous lesions or dyspigmentation  Musculoskeletal: No significant joint tenderness, swelling, warmth, erythema, or overt synovitis; no significant restriction in range of motion of joints examined  Neurologic: No focal sensory or motor " deficits  Psychiatric: Mood and affect appropriate    LABORATORY RESULTS REVIEWED AND INTERPRETED:  Lab Results   Component Value Date/Time    WBC 14.5 (H) 04/16/2022 08:25 AM    RBC 4.91 04/16/2022 08:25 AM    HEMOGLOBIN 15.7 04/16/2022 08:25 AM    HEMATOCRIT 46.9 04/16/2022 08:25 AM    MCV 95.5 04/16/2022 08:25 AM    MCH 32.0 04/16/2022 08:25 AM    MCHC 33.5 (L) 04/16/2022 08:25 AM    RDW 49.1 04/16/2022 08:25 AM    PLATELETCT 294 04/16/2022 08:25 AM    MPV 9.2 04/16/2022 08:25 AM    NEUTS 10.04 (H) 04/16/2022 08:25 AM    LYMPHOCYTES 20.90 (L) 04/16/2022 08:25 AM    MONOCYTES 7.10 04/16/2022 08:25 AM    EOSINOPHILS 1.80 04/16/2022 08:25 AM    BASOPHILS 0.30 04/16/2022 08:25 AM     Lab Results   Component Value Date/Time    ASTSGOT 16 04/16/2022 08:25 AM    ALTSGPT 24 04/16/2022 08:25 AM    ALKPHOSPHAT 112 (H) 04/16/2022 08:25 AM    TBILIRUBIN 0.4 04/16/2022 08:25 AM    TOTPROTEIN 7.2 04/16/2022 08:25 AM    ALBUMIN 4.3 04/16/2022 08:25 AM     Lab Results   Component Value Date/Time    SODIUM 141 04/16/2022 08:25 AM    POTASSIUM 4.5 04/16/2022 08:25 AM    CHLORIDE 105 04/16/2022 08:25 AM    CO2 23 04/16/2022 08:25 AM    GLUCOSE 111 (H) 04/16/2022 08:25 AM    BUN 17 04/16/2022 08:25 AM    CREATININE 0.57 04/16/2022 08:25 AM    CALCIUM 9.6 04/16/2022 08:25 AM     Lab Results   Component Value Date/Time    COLORURINE Yellow 04/16/2022 10:04 AM    SPECGRAVITY 1.023 04/16/2022 10:04 AM    PHURINE 5.5 04/16/2022 10:04 AM    GLUCOSEUR Negative 04/16/2022 10:04 AM    KETONES Negative 04/16/2022 10:04 AM    PROTEINURIN Negative 04/16/2022 10:04 AM     RADIOLOGY RESULTS REVIEWED AND INTERPRETED:  No relevant loadable results found in the system. Pertinent non-system results, if applicable, summarized under history above.      All relevant laboratory and imaging results reported on this note were reviewed and interpreted by me.         Assessment & Plan     Celine Li is a 59 y.o. female with history and physical as  noted above whose presentation merits the following clinical impressions and recommendations:    1. 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 Reasonable to consider alternate differential including relapsing polychondritis due to significant ENT involvement without obvious alternate sites of involvement and as patient is seronegative for GPA. Will assess for this as possible etiology of patient's presentation with testing for collagen type II antibody. Will also discontinue azathioprine 100mg PO daily and initiate mycophenolate 1000mg bid PO as patient is not having any significant benefit with azathioprine and as Tavneos was denied despite multiple attempts to order. Will have patient follow up in about 3 months to assess for improvement and review labs, and to reconsider rituximab use as patient did note some improvement with this medication (although this is slightly confounding because patient does not have any serologic evidence that a CD20 inhibitor would be beneficial).  - Miscellaneous Test (collagen type II antibody); Future  - CRP QUANTITIVE (NON-CARDIAC); Future  - Sed Rate; Future  - mycophenolate (CELLCEPT) 500 MG tablet; Take 2 Tablets by mouth 2 times a day.  Dispense: 120 Tablet; Refill: 5    2. Chronic pansinusitis  Chronic active problem that is the predominant manifestation of her GPA which sometimes has superimposed infection that is managed with long-term topical and oral antimicrobial agents. Given her immunosuppressed status, this raises some concern for microbial drug resistance for which she previously completed treatment with cefepime and meropenem.  - Continue moisturizing nasal rinses as needed  - Continue mupirocin 2% ointment twice daily as needed  - Routine follow-up with ENT    3. Peripheral sensory neuropathy of  hands and feet  Hands with new presentation of burning sensation on bilateral dorsal surfaces of hands as well as numbness and tingling in addition to the lingering involvement of the feet. Would note that given GPA can cause mononeuritis multiplex, this is an important differential to keep in consideration. For now will initiate gabapentin 300mg PO BID to relieve symptoms and assess for improvement.  - gabapentin (NEURONTIN) 300 MG Cap; Take 1 Capsule by mouth 2 times a day. or take 2 Capsules once nightly.  Dispense: 60 Capsule; Refill: 5    4. Primary osteoarthritis of both hands  Significant etiology of her biomechanical arthralgia which can be managed supportively.  - Paraffin wax hand therapy PRN as previously recommended  - Tylenol Arthritis and Voltaren 1% gel with or without oral NSAIDs as needed  - Consider intra-articular steroid injection if that becomes necessary    5. Immunosuppressed status (HCC)  High risk immunosuppressant use requiring routine monitoring labs prior to every visit to assess for possible side effects including but not limited to myelotoxicity, hepatotoxicity, and opportunistic infections.  - CBC WITH DIFFERENTIAL; Future  - Comp Metabolic Panel; Future  - Need to ascertain age-appropriate vaccines in addition to the ones listed above under immunizations      The above assessment and plan were discussed with the patient who acknowledged understanding of the plan.    FOLLOW-UP: Return in about 3 months (around 7/16/2025) for Short.         Thank you for the opportunity to participate in the care of Celine Li.    Shayy Ray D.O.   Internal Medicine PGY-2      ATTENDING ATTESTATION:  I personally saw and examined this patient, supervised and discussed the case with the resident who participated in the care of the patient. I have carefully reviewed the note in its entirety, made modifications as deemed appropriate, and agree with its content. I hereby attest that the  documentation accurately reflects the history, physical exam, assessment and plan of care for the patient.    Arturo Marc MD, MS, FACR  Rheumatologist, [facility], [facility]   of Clinical Medicine, Department of Internal Medicine  [facility]

## 2025-05-27 ENCOUNTER — PATIENT MESSAGE (OUTPATIENT)
Dept: RHEUMATOLOGY | Facility: MEDICAL CENTER | Age: 60
End: 2025-05-27
Payer: COMMERCIAL

## 2025-05-27 DIAGNOSIS — I77.82: Primary | ICD-10-CM

## 2025-06-06 RX ORDER — AZATHIOPRINE 50 MG/1
150 TABLET ORAL DAILY
Qty: 90 TABLET | Refills: 11 | Status: SHIPPED | OUTPATIENT
Start: 2025-06-06

## 2025-06-11 ENCOUNTER — TELEPHONE (OUTPATIENT)
Dept: RHEUMATOLOGY | Facility: MEDICAL CENTER | Age: 60
End: 2025-06-11
Payer: COMMERCIAL

## 2025-06-11 NOTE — TELEPHONE ENCOUNTER
[facility] called to clarify if pt was taking 2 or 3 tablets of azathioprine, confirmed it was increased to 3 tablets per day on 6/6/25. Provided verbal order to pharmacist.

## 2025-07-20 ASSESSMENT — RHEUMATOLOGY FOLLOW-UP QUESTIONNAIRE
MUSCLE PAIN: Y
TENDON PAIN: Y
BURNING: Y
JOINT INSTABILITY: Y
UNINTENTIONAL WEIGHT LOSS: >20 LBS
FATIGUE: Y
CHARACTERISTIC: SAME WITH ACTIVITY
STICKING IN THROAT: Y
INSOMNIA: Y
MARK ALL THE AREAS OF PAIN: 123053421
DURATION: 30-60 MINS
HELPFUL_MEDICATIONS: NASAL RINSE
TINGLING: Y
NUMBNESS: Y
JOINT SWELLING: Y
DRY MOUTH: Y
JOINT PAIN: SAME WITH ACTIVITY
BODY ACHES: Y

## 2025-07-22 ENCOUNTER — TELEPHONE (OUTPATIENT)
Dept: RHEUMATOLOGY | Facility: MEDICAL CENTER | Age: 60
End: 2025-07-22
Payer: COMMERCIAL

## 2025-07-22 DIAGNOSIS — G60.8 PERIPHERAL SENSORY NEUROPATHY: ICD-10-CM

## 2025-07-22 RX ORDER — GABAPENTIN 300 MG/1
CAPSULE ORAL
Qty: 180 CAPSULE | Refills: 3 | Status: SHIPPED | OUTPATIENT
Start: 2025-07-22

## 2025-07-23 ENCOUNTER — OFFICE VISIT (OUTPATIENT)
Dept: RHEUMATOLOGY | Facility: MEDICAL CENTER | Age: 60
End: 2025-07-23
Attending: STUDENT IN AN ORGANIZED HEALTH CARE EDUCATION/TRAINING PROGRAM
Payer: COMMERCIAL

## 2025-07-23 ENCOUNTER — HOSPITAL ENCOUNTER (OUTPATIENT)
Dept: LAB | Facility: MEDICAL CENTER | Age: 60
End: 2025-07-23
Attending: STUDENT IN AN ORGANIZED HEALTH CARE EDUCATION/TRAINING PROGRAM
Payer: COMMERCIAL

## 2025-07-23 VITALS
TEMPERATURE: 97.2 F | RESPIRATION RATE: 16 BRPM | OXYGEN SATURATION: 99 % | HEART RATE: 78 BPM | WEIGHT: 157.2 LBS | SYSTOLIC BLOOD PRESSURE: 108 MMHG | HEIGHT: 67 IN | DIASTOLIC BLOOD PRESSURE: 56 MMHG | BODY MASS INDEX: 24.67 KG/M2

## 2025-07-23 DIAGNOSIS — G60.8 PERIPHERAL SENSORY NEUROPATHY: ICD-10-CM

## 2025-07-23 DIAGNOSIS — I77.82: ICD-10-CM

## 2025-07-23 DIAGNOSIS — J32.4 CHRONIC PANSINUSITIS: ICD-10-CM

## 2025-07-23 DIAGNOSIS — I77.82: Primary | ICD-10-CM

## 2025-07-23 DIAGNOSIS — D84.9 IMMUNOSUPPRESSED STATUS (HCC): ICD-10-CM

## 2025-07-23 DIAGNOSIS — D53.9 MACROCYTIC ANEMIA: ICD-10-CM

## 2025-07-23 DIAGNOSIS — M19.042 PRIMARY OSTEOARTHRITIS OF BOTH HANDS: ICD-10-CM

## 2025-07-23 DIAGNOSIS — M19.041 PRIMARY OSTEOARTHRITIS OF BOTH HANDS: ICD-10-CM

## 2025-07-23 LAB
C3 SERPL-MCNC: 145 MG/DL (ref 87–200)
C4 SERPL-MCNC: 25.5 MG/DL (ref 19–52)
FOLATE SERPL-MCNC: >40 NG/ML
VIT B12 SERPL-MCNC: 231 PG/ML (ref 211–911)

## 2025-07-23 PROCEDURE — 99213 OFFICE O/P EST LOW 20 MIN: CPT | Performed by: STUDENT IN AN ORGANIZED HEALTH CARE EDUCATION/TRAINING PROGRAM

## 2025-07-23 PROCEDURE — 99214 OFFICE O/P EST MOD 30 MIN: CPT | Performed by: STUDENT IN AN ORGANIZED HEALTH CARE EDUCATION/TRAINING PROGRAM

## 2025-07-23 PROCEDURE — 86162 COMPLEMENT TOTAL (CH50): CPT

## 2025-07-23 PROCEDURE — 3074F SYST BP LT 130 MM HG: CPT | Performed by: STUDENT IN AN ORGANIZED HEALTH CARE EDUCATION/TRAINING PROGRAM

## 2025-07-23 PROCEDURE — 83516 IMMUNOASSAY NONANTIBODY: CPT

## 2025-07-23 PROCEDURE — 3078F DIAST BP <80 MM HG: CPT | Performed by: STUDENT IN AN ORGANIZED HEALTH CARE EDUCATION/TRAINING PROGRAM

## 2025-07-23 PROCEDURE — 86160 COMPLEMENT ANTIGEN: CPT | Mod: 91

## 2025-07-23 PROCEDURE — 82746 ASSAY OF FOLIC ACID SERUM: CPT

## 2025-07-23 PROCEDURE — 36415 COLL VENOUS BLD VENIPUNCTURE: CPT

## 2025-07-23 PROCEDURE — 82607 VITAMIN B-12: CPT

## 2025-07-23 PROCEDURE — 86036 ANCA SCREEN EACH ANTIBODY: CPT

## 2025-07-23 RX ORDER — METHYLPREDNISOLONE 4 MG/1
TABLET ORAL
Qty: 21 TABLET | Refills: 0 | Status: SHIPPED | OUTPATIENT
Start: 2025-07-23

## 2025-07-23 NOTE — PROGRESS NOTES
[facility] RHEUMATOLOGY  [address], [address], [place] [address]  Phone: [phone] ? Fax: [phone]  [facility]/Health-Services/Rheumatology     FOLLOW-UP VISIT NOTE        DATE OF SERVICE: 07/23/2025            Subjective  PRIMARY CARE PRACTITIONER:  Biju Rivera M.D.  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 female with pertinent history notable for GPA of nose diagnosed in 11/2020 (based on nasal tissue biopsy at 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 Previously under the care of [facility] in [place], she initially presented on 3/17/23 to establish care for continued evaluation and management of her GPA. Reported onset of GPA symptoms in 3/2020 with worsening of her chronic pansinusitis leading to nasal septum damage with bloody discharge, fluid-filled sinuses, facial pressure, and ear pressure with vertigo all of which led to her eventual diagnosis of GPA. Reported that though these had improved with treatment since diagnosis, they had been waxing/waning over time with variable degrees of mild to moderate symptoms. She  additionally reported history of photosensitive rash (on face, neck, chest, and arms), cold-induced color changes of fingers, and tingling/numbness of feet.     Pertinent treatments: Prednisone high-to-low dose (effective but caused suicidal ideation), mupirocin 2% ointment PRN (3/2022-present), Bactrim DS 3 times weekly (3/2022-present), methotrexate 15>10 mg oral weekly with folic acid 1 mg daily (started 1/2021, presumably caused elevated liver enzymes, dose decreased 2/2023-12/2023, became less effective), Rituxan 500 mg IV every 6 months (1/2021-11/2024, unclear benefit), azathioprine 50>75>100 mg oral daily (ordered 12/26/23-present, unclear benefit), Tavneos 30 mg BID (ordered 1/16/25, unable to initiate due to insurance issues), mycophenolate 1000mg BID (ordered 4/16/25-present).     Pertinent positive labs: Undetectable B-cells CD19+/CD20+ expression at 99% viability (in 9/2024 at [facility]).     Pertinent negative labs: Negative SHAN, ANCA, anti-MPO, anti-PR3, C3, C4, and CH50 (in 3/2023), TPMT 28.9 (in 12/2023), CRP, ESR, eGFR, creatinine, LFTs, and acceptable CBC (in 9/2024 at [facility]). Negative CBC, CMP, ESR (4/2025 at [facility]). Negative collagen type II antibodies (in 6/2025)     Pertinent imaging: Xray right hip (1/2025) with minimal degenerative change.        INTERVAL HISTORY:  Reports interval history as noted on the questionnaire below or scanned under media tab.      [facility] Established Patient History Form     7/20/2025 12:22 PM PDT - Filed by Patient   MAIN REASON FOR VISIT follow up for GPA   INTERVAL HISTORY OF ILLNESS   Date of worsening onset: 30 days   Preceding incident/ailment: thick mucus in upper nasal   Describe/list your symptoms: plugged nasal airway   Exacerbating factors:     Alleviating factors: nasal rinse 2-3  times daily   Helpful medications: nasal rinse   Ineffective medications:     Severity of pain (scale of 1-10):     Personal/emotional stressors:     Luke All The Areas Of Pain     REVIEW OF SYMPTOMS     General   Fevers     Chills     Night sweats     Malaise     Fatigue Yes   Unintentional weight loss >20 lbs   Musculoskeletal   Joint pain Same with activity   Morning stiffness duration 30-60 mins   Morning stiffness characteristic Same with activity   Joint swelling Yes   Joint instability Yes   Tendon pain Yes   Muscle pain Yes   Body aches Yes   Dermatologic   Hair loss with bald spots     Hair shedding     Skin thickening     Skin plaques     Sunlight-induced skin rash     Cold-induced color changes (white, purple, red on rewarming)     Neurologic/Psychiatric   Weakness     Spasms     Tingling Yes   Burning Yes   Numbness Yes   Insomnia Yes   Anxiety     Depression     Head/Eyes   Headaches     Temple pain     Dizziness     Dry eyes     Eye pain     Eye redness     Blurry vision     Vision loss     Ears/Nose   Ear pain     Ringing in ears     Vertigo     Hearing loss     Nasal ulcers     Nosebleeds     Sinus pain     Nasal congestion     Snoring     Mouth/Throat   Oral ulcers     Bleeding gums     Dry mouth Yes   Cavities     Sore throat     Sticking in throat Yes   Difficulty speaking     Neck/Lymphatics   Thyroid pain     Thyroid swelling     Lymph node swelling     Cardiac/Respiratory   Chest pain with breathing     Dry cough     Cough with bloody phlegm     Shortness of breath     Fast heartbeats     Irregular heartbeats     Gastrointestinal   Nausea     Vomiting     Difficulty swallowing     Heartburn     Abdominal pain     Bloody stool     Mucus stool     Genitourinary   Pelvic pain     Genital ulcers     Abnormal discharge     Burning urination     Frothy urine     Blood in urine           REVIEW OF SYSTEMS:  Except as noted in the history above, relevant review of systems with emphasis on autoimmune rheumatic diseases was otherwise negative.        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 No pertinent past medical history.     PAST SURGICAL HISTORY:  [Past Surgical History]    [Past Surgical History]        Procedure Laterality Date    PROCEDURE LARGE JOINT INJECTION/ARTHROCENTESIS   1/22/2025    JERAMY BY LAPAROSCOPY N/A 4/16/2022     Procedure: CHOLECYSTECTOMY, LAPAROSCOPIC;  Surgeon: Demario Lal M.D.;  Location: SURGERY [facility];  Service: General        SOCIAL HISTORY:  Social History               Socioeconomic History    Marital status:        Spouse name: Not on file    Number of children: Not on file    Years of education: Not on file    Highest education level: 11th grade   Occupational History    Not on file   Tobacco Use    Smoking status: Some Days       Current packs/day: 0.50       Average packs/day: 0.5 packs/day for 30.0 years (15.0 ttl pk-yrs)       Types: Cigarettes    Smokeless tobacco: Never   Vaping Use    Vaping status: Never Used   Substance and Sexual Activity    Alcohol use: Not Currently       Comment: occ    Drug use: Never    Sexual activity: Not on file   Other Topics Concern    Not on file   Social History Narrative    Not on file      Social Drivers of Health           Financial Resource Strain: Low Risk  (9/29/2024)     Overall Financial Resource Strain (CARDIA)      Difficulty of Paying Living Expenses: Not hard at all   Food Insecurity: No Food Insecurity (9/29/2024)     Hunger Vital Sign      Worried About Running Out of Food in the Last Year: Never true      Ran Out of Food in the Last Year:  Never true   Transportation Needs: No Transportation Needs (9/29/2024)     PRAPARE - Transportation      Lack of Transportation (Medical): No      Lack of Transportation (Non-Medical): No   Physical Activity: Sufficiently Active (9/29/2024)     Exercise Vital Sign      Days of Exercise per Week: 4 days      Minutes of Exercise per Session: 120 min   Stress: No Stress Concern Present (9/29/2024)     [employer] - Occupational Stress Questionnaire      Feeling of Stress : Not at all   Social Connections: Moderately Isolated (9/29/2024)     Social Connection and Isolation Panel [NHANES]      Frequency of Communication with Friends and Family: Twice a week      Frequency of Social Gatherings with Friends and Family: Twice a week      Attends [employer] Services: Never      Active Member of Clubs or Organizations: No      Attends Club or Organization Meetings: Never      Marital Status:    Intimate Partner Violence: Not on file   Housing Stability: Unknown (9/29/2024)     Housing Stability Vital Sign      Unable to Pay for Housing in the Last Year: No      Number of Times Moved in the Last Year: Not on file      Homeless in the Last Year: No            FAMILY HISTORY:  Family History   History reviewed. No pertinent family history.        MEDICATIONS:  Current Medications        Current Outpatient Medications   Medication Sig    Folic Acid 5 MG Cap Take 1 Capsule by mouth every day.    methylPREDNISolone (MEDROL DOSEPAK) 4 MG Tablet Therapy Pack As directed on the packaging label.    gabapentin (NEURONTIN) 300 MG Cap Take 1 capsule twice daily or take 2 capsules once nightly.    azaTHIOprine (IMURAN) 50 MG Tab Take 3 Tablets by mouth every day.    riTUXimab (RITUXAN) 100 MG/10ML Solution injection 500mg every 6 months    meloxicam (MOBIC) 15 MG tablet Take 15 mg by mouth every day.    QUEtiapine (SEROQUEL) 25 MG Tab TAKE (1) ONE Tablet BY MOUTH EVERY EVENING    topiramate (TOPAMAX) 25  "MG Tab TAKE (1) ONE Tablet BY MOUTH TWICE A DAY    atorvastatin (LIPITOR) 20 MG Tab Take 20 mg by mouth every day.    cyclobenzaprine (FLEXERIL) 10 mg Tab TAKE ONE TABLET BY MOUTH EVERY EVENING AT BEDTIME    amitriptyline (ELAVIL) 10 MG Tab TAKE 1 TO 3 TABLETS BY MOUTH EVERY EVENING AT BEDTIME AS NEEDED    mupirocin (BACTROBAN) 2 % Ointment Apply 1 Application. topically 2 times a day.    zolpidem (AMBIEN) 10 MG Tab Take 10 mg by mouth at bedtime.    promethazine (PHENERGAN) 25 MG Tab Take 25 mg by mouth 1 time a day as needed for Nausea/Vomiting. Pt takes with weekly methotrexate            ALLERGIES:   [Allergies]    [Allergies]        Allergen Reactions    Penicillins Vomiting       N/V, fever   Reaction ~15 years ago        IMMUNIZATIONS:       Immunization History   Administered Date(s) Administered    Covid-19 Mrna (Spikevax) Moderna 12+ Years 10/05/2024    MODERNA SARS-COV-2 VACCINE (12+) 02/03/2021, 03/03/2021, 10/29/2021    SARS-COV-2 (COVID-19) vaccine, UNSPECIFIED 10/20/2022                Objective[]Expand by Default  Vital Signs: /56 (BP Location: Left arm, Patient Position: Sitting, BP Cuff Size: Adult)   Pulse 78   Temp 36.2 °C (97.2 °F) (Temporal)   Resp 16   Ht 1.702 m (5' 7\")   Wt 71.3 kg (157 lb 3.2 oz)   SpO2 99% Body mass index is 24.62 kg/m².     General: Appears well and comfortable  Eyes: No scleral or conjunctival lesions  ENT: Dry pale nasal mucosae with thick crusted non-bloody mucus  Head/Neck: No apparent scalp or neck lesions  Cardiovascular: Regular rate and rhythm  Respiratory: Breathing quiet and unlabored  Gastrointestinal: No apparent organomegaly or abdominal masses  Integumentary: No significant cutaneous lesions or dyspigmentation  Musculoskeletal: No significant joint tenderness, swelling, warmth, erythema, or overt synovitis; no significant restriction in range of motion of joints examined  Neurologic: No focal sensory or motor deficits  Psychiatric: Mood and " affect appropriate        LABORATORY RESULTS REVIEWED AND INTERPRETED:           Lab Results   Component Value Date/Time     WBC 14.5 (H) 04/16/2022 08:25 AM     RBC 4.91 04/16/2022 08:25 AM     HEMOGLOBIN 15.7 04/16/2022 08:25 AM     HEMATOCRIT 46.9 04/16/2022 08:25 AM     MCV 95.5 04/16/2022 08:25 AM     MCH 32.0 04/16/2022 08:25 AM     MCHC 33.5 (L) 04/16/2022 08:25 AM     RDW 49.1 04/16/2022 08:25 AM     PLATELETCT 294 04/16/2022 08:25 AM     MPV 9.2 04/16/2022 08:25 AM     NEUTS 10.04 (H) 04/16/2022 08:25 AM     LYMPHOCYTES 20.90 (L) 04/16/2022 08:25 AM     MONOCYTES 7.10 04/16/2022 08:25 AM     EOSINOPHILS 1.80 04/16/2022 08:25 AM     BASOPHILS 0.30 04/16/2022 08:25 AM            Lab Results   Component Value Date/Time     ASTSGOT 16 04/16/2022 08:25 AM     ALTSGPT 24 04/16/2022 08:25 AM     ALKPHOSPHAT 112 (H) 04/16/2022 08:25 AM     TBILIRUBIN 0.4 04/16/2022 08:25 AM     TOTPROTEIN 7.2 04/16/2022 08:25 AM     ALBUMIN 4.3 04/16/2022 08:25 AM            Lab Results   Component Value Date/Time     SODIUM 141 04/16/2022 08:25 AM     POTASSIUM 4.5 04/16/2022 08:25 AM     CHLORIDE 105 04/16/2022 08:25 AM     CO2 23 04/16/2022 08:25 AM     GLUCOSE 111 (H) 04/16/2022 08:25 AM     BUN 17 04/16/2022 08:25 AM     CREATININE 0.57 04/16/2022 08:25 AM     CALCIUM 9.6 04/16/2022 08:25 AM            Lab Results   Component Value Date/Time     COLORURINE Yellow 04/16/2022 10:04 AM     SPECGRAVITY 1.023 04/16/2022 10:04 AM     PHURINE 5.5 04/16/2022 10:04 AM     GLUCOSEUR Negative 04/16/2022 10:04 AM     KETONES Negative 04/16/2022 10:04 AM     PROTEINURIN Negative 04/16/2022 10:04 AM      RADIOLOGY RESULTS REVIEWED AND INTERPRETED:  No relevant loadable results found in the system. Pertinent non-system results, if applicable, summarized under history above.         All relevant laboratory and imaging results reported on this note were reviewed and interpreted by me.            Assessment & Plan  Celine Li is a 60  y.o. female with history and physical as noted above whose presentation merits the following clinical impressions and recommendations:     1. 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 However, still early to  effectiveness of treatment after azathioprine dose recently increased. Also, it would reasonable to re-check ANCA profile given negative collagen type II antibodies.  - ANCA-ASSOCIATED VASCULITIS PROFILE (ANCA/MPO/PR3); Future  - COMPLEMENT C3; Future  - COMPLEMENT C4; Future  - COMPLEMENT TOTAL (CH50); Future  - methylPREDNISolone (MEDROL DOSEPAK) 4 MG Tablet Therapy Pack; As directed on the packaging label.  Dispense: 21 Tablet; Refill: 0  - Continue azathioprine 150 mg daily     2. Chronic pansinusitis  Chronic active problem that is the predominant manifestation of her GPA which sometimes has superimposed infection that is managed with long-term topical and oral antimicrobial agents. Given her immunosuppressed status, this raises some concern for microbial drug resistance for which she previously completed treatment with cefepime and meropenem.  - Continue moisturizing nasal rinses as needed  - Continue mupirocin 2% ointment twice daily as needed  - Routine follow-up with ENT     3. Peripheral sensory neuropathy of hands and feet  Clinically appears to be improved on the current dose of gabapentin, so need for treatment escalation. Would note that given GPA can cause mononeuritis multiplex, this is an important differential to keep in consideration.  - Continue gabapentin 300 mg 2 times daily or 600 mg nightly     4. Primary osteoarthritis of both hands  Significant etiology of her biomechanical arthralgia which can be managed supportively.  - Paraffin wax hand therapy PRN as previously recommended  - Tylenol Arthritis and Voltaren 1% gel with or without oral NSAIDs as needed  - Consider  intra-articular steroid injection if that becomes necessary     5. Macrocytic anemia  Presumably secondary to azathioprine given mild erythropenia and macrocytosis, so would be reasonable to increase dose of folic acid and check vitamin B12 as a differential cause of macrocytic anemia  - Folic Acid 5 MG Cap; Take 1 Capsule by mouth every day.  Dispense: 90 Capsule; Refill: 3  - FOLATE; Future  - VITAMIN B12; Future  - CBC (previously ordered, so reprinted)     6. Immunosuppressed status (HCC)  High risk immunosuppressant use requiring routine monitoring labs prior to every visit to assess for possible side effects including but not limited to myelotoxicity, hepatotoxicity, and opportunistic infections.  - CBC and CMP (previously ordered, so reprinted)  - Need to ascertain age-appropriate vaccines in addition to the ones listed above under immunizations        The above assessment and plan were discussed with the patient who acknowledged understanding of the plan.     FOLLOW-UP: Return in about 4 months (around 11/23/2025) for Short.            Thank you for the opportunity to participate in the care of Celine Li.     Betina Ayala M.D.   Internal Medicine Resident, PGY-3        ATTENDING ATTESTATION:  I personally saw and examined this patient, supervised and discussed the case with the resident who participated in the care of the patient. I have carefully reviewed the note in its entirety, made modifications as deemed appropriate, and agree with its content. I hereby attest that the documentation accurately reflects the history, physical exam, assessment and plan of care for the patient.     Arturo Marc MD, MS, FACR  Rheumatologist, [facility], [facility]   of Clinical Medicine, Department of Internal Medicine  [facility]

## 2025-07-23 NOTE — PROGRESS NOTES
[facility] RHEUMATOLOGY  [address], [address], [place] [address]  Phone: [phone] ? Fax: [phone]  [facility]/Health-Services/Rheumatology    FOLLOW-UP VISIT NOTE      DATE OF SERVICE: 07/23/2025         Subjective     PRIMARY CARE PRACTITIONER:  Biju Rivera M.D.  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 Previously under the care of [facility] Rheumatology in [place], she initially presented on 3/17/23 to establish care for continued evaluation and management of her GPA. Reported onset of GPA symptoms in 3/2020 with worsening of her chronic pansinusitis leading to nasal septum damage with bloody discharge, fluid-filled sinuses, facial pressure, and ear pressure with vertigo all of which led to her eventual diagnosis of GPA. Reported that though these had improved with treatment since diagnosis, they had been waxing/waning over time with variable degrees of mild to moderate symptoms. She additionally reported  history of photosensitive rash (on face, neck, chest, and arms), cold-induced color changes of fingers, and tingling/numbness of feet.     Pertinent treatments: Prednisone high-to-low dose (effective but caused suicidal ideation), mupirocin 2% ointment PRN (3/2022-present), Bactrim DS 3 times weekly (3/2022-present), methotrexate 15>10 mg oral weekly with folic acid 1 mg daily (started 1/2021, presumably caused elevated liver enzymes, dose decreased 2/2023-12/2023, became less effective), Rituxan 500 mg IV every 6 months (1/2021-11/2024, unclear benefit), azathioprine 50>75>100 mg oral daily (ordered 12/26/23-present, unclear benefit), Tavneos 30 mg BID (ordered 1/16/25, unable to initiate due to insurance issues), mycophenolate 1000mg BID (ordered 4/16/25-present).     Pertinent positive labs: Undetectable B-cells CD19+/CD20+ expression at 99% viability (in 9/2024 at [facility]).     Pertinent negative labs: Negative SHAN, ANCA, anti-MPO, anti-PR3, C3, C4, and CH50 (in 3/2023), TPMT 28.9 (in 12/2023), CRP, ESR, eGFR, creatinine, LFTs, and acceptable CBC (in 9/2024 at [facility]). Negative CBC, CMP, ESR (4/2025 at [facility]). Negative collagen type II antibodies (in 6/2025)     Pertinent imaging: Xray right hip (1/2025) with minimal degenerative change.      INTERVAL HISTORY:  Reports interval history as noted on the questionnaire below or scanned under media tab.  [facility] [place] [facility] Established Patient History Form    7/20/2025 12:22 PM PDT - Filed by Patient   MAIN REASON FOR VISIT follow up for GPA   INTERVAL HISTORY OF ILLNESS   Date of worsening onset: 30 days   Preceding incident/ailment: thick mucus in upper nasal   Describe/list your symptoms: plugged nasal airway   Exacerbating factors:    Alleviating factors: nasal rinse 2-3  times daily   Helpful medications: nasal rinse   Ineffective medications:    Severity of pain (scale of 1-10):    Personal/emotional stressors:    Luke All The Areas Of Pain    REVIEW OF SYMPTOMS    General    Fevers    Chills    Night sweats    Malaise    Fatigue Yes   Unintentional weight loss >20 lbs   Musculoskeletal   Joint pain Same with activity   Morning stiffness duration 30-60 mins   Morning stiffness characteristic Same with activity   Joint swelling Yes   Joint instability Yes   Tendon pain Yes   Muscle pain Yes   Body aches Yes   Dermatologic   Hair loss with bald spots    Hair shedding    Skin thickening    Skin plaques    Sunlight-induced skin rash    Cold-induced color changes (white, purple, red on rewarming)    Neurologic/Psychiatric   Weakness    Spasms    Tingling Yes   Burning Yes   Numbness Yes   Insomnia Yes   Anxiety    Depression    Head/Eyes   Headaches    Temple pain    Dizziness    Dry eyes    Eye pain    Eye redness    Blurry vision    Vision loss    Ears/Nose   Ear pain    Ringing in ears    Vertigo    Hearing loss    Nasal ulcers    Nosebleeds    Sinus pain    Nasal congestion    Snoring    Mouth/Throat   Oral ulcers    Bleeding gums    Dry mouth Yes   Cavities    Sore throat    Sticking in throat Yes   Difficulty speaking    Neck/Lymphatics   Thyroid pain    Thyroid swelling    Lymph node swelling    Cardiac/Respiratory   Chest pain with breathing    Dry cough    Cough with bloody phlegm    Shortness of breath    Fast heartbeats    Irregular heartbeats    Gastrointestinal   Nausea    Vomiting    Difficulty swallowing    Heartburn    Abdominal pain    Bloody stool    Mucus stool    Genitourinary   Pelvic pain    Genital ulcers    Abnormal discharge    Burning urination    Frothy urine    Blood in urine        REVIEW OF SYSTEMS:  Except as noted in the history above, relevant review of systems with emphasis on autoimmune rheumatic diseases was otherwise negative.      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) 04/16/2022       PAST MEDICAL HISTORY:  Past Medical History[1]    PAST SURGICAL HISTORY:  Past Surgical History[2]    SOCIAL HISTORY:  Social History     Socioeconomic History    Marital status:      Spouse name: Not on file    Number of children: Not on file    Years of education: Not on file    Highest education level: 11th grade   Occupational History    Not on file   Tobacco Use    Smoking status: Some Days     Current packs/day: 0.50     Average packs/day: 0.5 packs/day for 30.0 years (15.0 ttl pk-yrs)     Types: Cigarettes    Smokeless tobacco: Never   Vaping Use    Vaping status: Never Used   Substance and Sexual Activity    Alcohol use: Not Currently     Comment: occ    Drug use: Never    Sexual activity: Not on file   Other Topics Concern    Not on file   Social History Narrative    Not on file     Social Drivers of Health     Financial Resource Strain: Low Risk  (9/29/2024)    Overall Financial Resource Strain (CARDIA)     Difficulty of Paying Living Expenses: Not hard at all   Food Insecurity: No Food Insecurity (9/29/2024)    Hunger Vital Sign     Worried About Running Out of Food in the Last Year: Never true     Ran Out of Food in the Last Year: Never true   Transportation Needs: No Transportation Needs (9/29/2024)    PRAPARE - Transportation     Lack of Transportation (Medical): No     Lack of Transportation (Non-Medical): No   Physical Activity: Sufficiently Active (9/29/2024)    Exercise Vital Sign     Days of Exercise per Week: 4 days     Minutes of Exercise per Session: 120 min   Stress: No Stress Concern Present (9/29/2024)    [employer] - Occupational Stress Questionnaire     Feeling of Stress : Not at all    Social Connections: Moderately Isolated (9/29/2024)    Social Connection and Isolation Panel [NHANES]     Frequency of Communication with Friends and Family: Twice a week     Frequency of Social Gatherings with Friends and Family: Twice a week     Attends Christian Services: Never     Active Member of Clubs or Organizations: No     Attends Club or Organization Meetings: Never     Marital Status:    Intimate Partner Violence: Not on file   Housing Stability: Unknown (9/29/2024)    Housing Stability Vital Sign     Unable to Pay for Housing in the Last Year: No     Number of Times Moved in the Last Year: Not on file     Homeless in the Last Year: No       FAMILY HISTORY:  History reviewed. No pertinent family history.    MEDICATIONS:  Current Outpatient Medications   Medication Sig    Folic Acid 5 MG Cap Take 1 Capsule by mouth every day.    methylPREDNISolone (MEDROL DOSEPAK) 4 MG Tablet Therapy Pack As directed on the packaging label.    gabapentin (NEURONTIN) 300 MG Cap Take 1 capsule twice daily or take 2 capsules once nightly.    azaTHIOprine (IMURAN) 50 MG Tab Take 3 Tablets by mouth every day.    riTUXimab (RITUXAN) 100 MG/10ML Solution injection 500mg every 6 months    meloxicam (MOBIC) 15 MG tablet Take 15 mg by mouth every day.    QUEtiapine (SEROQUEL) 25 MG Tab TAKE (1) ONE Tablet BY MOUTH EVERY EVENING    topiramate (TOPAMAX) 25 MG Tab TAKE (1) ONE Tablet BY MOUTH TWICE A DAY    atorvastatin (LIPITOR) 20 MG Tab Take 20 mg by mouth every day.    cyclobenzaprine (FLEXERIL) 10 mg Tab TAKE ONE TABLET BY MOUTH EVERY EVENING AT BEDTIME    amitriptyline (ELAVIL) 10 MG Tab TAKE 1 TO 3 TABLETS BY MOUTH EVERY EVENING AT BEDTIME AS NEEDED    mupirocin (BACTROBAN) 2 % Ointment Apply 1 Application. topically 2 times a day.    zolpidem (AMBIEN) 10 MG Tab Take 10 mg by mouth at bedtime.    promethazine (PHENERGAN) 25 MG Tab Take 25 mg by mouth 1 time a day as needed for Nausea/Vomiting. Pt takes with weekly  "methotrexate       ALLERGIES:   Allergies[3]    IMMUNIZATIONS:  Immunization History   Administered Date(s) Administered    Covid-19 Mrna (Spikevax) Moderna 12+ Years 10/05/2024    MODERNA SARS-COV-2 VACCINE (12+) 02/03/2021, 03/03/2021, 10/29/2021    SARS-COV-2 (COVID-19) vaccine, UNSPECIFIED 10/20/2022            Objective     Vital Signs: /56 (BP Location: Left arm, Patient Position: Sitting, BP Cuff Size: Adult)   Pulse 78   Temp 36.2 °C (97.2 °F) (Temporal)   Resp 16   Ht 1.702 m (5' 7\")   Wt 71.3 kg (157 lb 3.2 oz)   SpO2 99% Body mass index is 24.62 kg/m².    General: Appears well and comfortable  Eyes: No scleral or conjunctival lesions  ENT: Dry pale nasal mucosae with thick crusted non-bloody mucus  Head/Neck: No apparent scalp or neck lesions  Cardiovascular: Regular rate and rhythm  Respiratory: Breathing quiet and unlabored  Gastrointestinal: No apparent organomegaly or abdominal masses  Integumentary: No significant cutaneous lesions or dyspigmentation  Musculoskeletal: No significant joint tenderness, swelling, warmth, erythema, or overt synovitis; no significant restriction in range of motion of joints examined  Neurologic: No focal sensory or motor deficits  Psychiatric: Mood and affect appropriate      LABORATORY RESULTS REVIEWED AND INTERPRETED:    Lab Results   Component Value Date/Time    WBC 14.5 (H) 04/16/2022 08:25 AM    RBC 4.91 04/16/2022 08:25 AM    HEMOGLOBIN 15.7 04/16/2022 08:25 AM    HEMATOCRIT 46.9 04/16/2022 08:25 AM    MCV 95.5 04/16/2022 08:25 AM    MCH 32.0 04/16/2022 08:25 AM    MCHC 33.5 (L) 04/16/2022 08:25 AM    RDW 49.1 04/16/2022 08:25 AM    PLATELETCT 294 04/16/2022 08:25 AM    MPV 9.2 04/16/2022 08:25 AM    NEUTS 10.04 (H) 04/16/2022 08:25 AM    LYMPHOCYTES 20.90 (L) 04/16/2022 08:25 AM    MONOCYTES 7.10 04/16/2022 08:25 AM    EOSINOPHILS 1.80 04/16/2022 08:25 AM    BASOPHILS 0.30 04/16/2022 08:25 AM     Lab Results   Component Value Date/Time    ASTSGOT 16 " 04/16/2022 08:25 AM    ALTSGPT 24 04/16/2022 08:25 AM    ALKPHOSPHAT 112 (H) 04/16/2022 08:25 AM    TBILIRUBIN 0.4 04/16/2022 08:25 AM    TOTPROTEIN 7.2 04/16/2022 08:25 AM    ALBUMIN 4.3 04/16/2022 08:25 AM     Lab Results   Component Value Date/Time    SODIUM 141 04/16/2022 08:25 AM    POTASSIUM 4.5 04/16/2022 08:25 AM    CHLORIDE 105 04/16/2022 08:25 AM    CO2 23 04/16/2022 08:25 AM    GLUCOSE 111 (H) 04/16/2022 08:25 AM    BUN 17 04/16/2022 08:25 AM    CREATININE 0.57 04/16/2022 08:25 AM    CALCIUM 9.6 04/16/2022 08:25 AM     Lab Results   Component Value Date/Time    COLORURINE Yellow 04/16/2022 10:04 AM    SPECGRAVITY 1.023 04/16/2022 10:04 AM    PHURINE 5.5 04/16/2022 10:04 AM    GLUCOSEUR Negative 04/16/2022 10:04 AM    KETONES Negative 04/16/2022 10:04 AM    PROTEINURIN Negative 04/16/2022 10:04 AM     RADIOLOGY RESULTS REVIEWED AND INTERPRETED:  No relevant loadable results found in the system. Pertinent non-system results, if applicable, summarized under history above.       All relevant laboratory and imaging results reported on this note were reviewed and interpreted by me.         Assessment & Plan     Celine Li is a 60 y.o. female with history and physical as noted above whose presentation merits the following clinical impressions and recommendations:    1. 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 However, still early to  effectiveness of treatment after azathioprine dose recently increased. Also, it would reasonable to re-check ANCA profile given negative collagen type II antibodies.  - ANCA-ASSOCIATED VASCULITIS PROFILE (ANCA/MPO/PR3); Future  - COMPLEMENT C3; Future  - COMPLEMENT C4; Future  - COMPLEMENT TOTAL (CH50); Future  - methylPREDNISolone (MEDROL DOSEPAK) 4 MG Tablet Therapy Pack; As directed on the packaging label.  Dispense: 21 Tablet; Refill: 0  - Continue  azathioprine 150 mg daily    2. Chronic pansinusitis  Chronic active problem that is the predominant manifestation of her GPA which sometimes has superimposed infection that is managed with long-term topical and oral antimicrobial agents. Given her immunosuppressed status, this raises some concern for microbial drug resistance for which she previously completed treatment with cefepime and meropenem.  - Continue moisturizing nasal rinses as needed  - Continue mupirocin 2% ointment twice daily as needed  - Routine follow-up with ENT    3. Peripheral sensory neuropathy of hands and feet  Clinically appears to be improved on the current dose of gabapentin, so need for treatment escalation. Would note that given GPA can cause mononeuritis multiplex, this is an important differential to keep in consideration.  - Continue gabapentin 300 mg 2 times daily or 600 mg nightly    4. Primary osteoarthritis of both hands  Significant etiology of her biomechanical arthralgia which can be managed supportively.  - Paraffin wax hand therapy PRN as previously recommended  - Tylenol Arthritis and Voltaren 1% gel with or without oral NSAIDs as needed  - Consider intra-articular steroid injection if that becomes necessary    5. Macrocytic anemia  Presumably secondary to azathioprine given mild erythropenia and macrocytosis, so would be reasonable to increase dose of folic acid and check vitamin B12 as a differential cause of macrocytic anemia  - Folic Acid 5 MG Cap; Take 1 Capsule by mouth every day.  Dispense: 90 Capsule; Refill: 3  - FOLATE; Future  - VITAMIN B12; Future  - CBC (previously ordered, so reprinted)    6. Immunosuppressed status (HCC)  High risk immunosuppressant use requiring routine monitoring labs prior to every visit to assess for possible side effects including but not limited to myelotoxicity, hepatotoxicity, and opportunistic infections.  - CBC and CMP (previously ordered, so reprinted)  - Need to ascertain  age-appropriate vaccines in addition to the ones listed above under immunizations      The above assessment and plan were discussed with the patient who acknowledged understanding of the plan.    FOLLOW-UP: Return in about 4 months (around 11/23/2025) for Short.         Thank you for the opportunity to participate in the care of Celine Li.    Betina Ayala M.D.   Internal Medicine Resident, PGY-3      ATTENDING ATTESTATION:  I personally saw and examined this patient, supervised and discussed the case with the resident who participated in the care of the patient. I have carefully reviewed the note in its entirety, made modifications as deemed appropriate, and agree with its content. I hereby attest that the documentation accurately reflects the history, physical exam, assessment and plan of care for the patient.    Arturo Marc MD, MS, FACR  Rheumatologist, [facility] Rheumatology, [facility]   of Clinical Medicine, Department of Internal Medicine  [facility] [employer]         [1] History reviewed. No pertinent past medical history.  [2]   Past Surgical History:  Procedure Laterality Date    PROCEDURE LARGE JOINT INJECTION/ARTHROCENTESIS  1/22/2025    JERAMY BY LAPAROSCOPY N/A 4/16/2022    Procedure: CHOLECYSTECTOMY, LAPAROSCOPIC;  Surgeon: Demario Lal M.D.;  Location: SURGERY [facility];  Service: General   [3]   Allergies  Allergen Reactions    Penicillins Vomiting     N/V, fever   Reaction ~15 years ago

## 2025-07-23 NOTE — PROGRESS NOTES
[facility] RHEUMATOLOGY  [address], [address], [place] [address]  Phone: [phone] ? Fax: [phone]  [facility]/Health-Services/Rheumatology     FOLLOW-UP VISIT NOTE        DATE OF SERVICE: 07/23/2025            Subjective  PRIMARY CARE PRACTITIONER:  Biju Rivera M.D.  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 Previously under the care of [facility] Rheumatology in [place], she initially presented on 3/17/23 to establish care for continued evaluation and management of her GPA. Reported onset of GPA symptoms in 3/2020 with worsening of her chronic pansinusitis leading to nasal septum damage with bloody discharge, fluid-filled sinuses, facial pressure, and ear pressure with vertigo all of which led to her eventual diagnosis of GPA. Reported that though these had improved with treatment since diagnosis, they had been waxing/waning over time with variable degrees of mild to moderate symptoms. She  additionally reported history of photosensitive rash (on face, neck, chest, and arms), cold-induced color changes of fingers, and tingling/numbness of feet.     Pertinent treatments: Prednisone high-to-low dose (effective but caused suicidal ideation), mupirocin 2% ointment PRN (3/2022-present), Bactrim DS 3 times weekly (3/2022-present), methotrexate 15>10 mg oral weekly with folic acid 1 mg daily (started 1/2021, presumably caused elevated liver enzymes, dose decreased 2/2023-12/2023, became less effective), Rituxan 500 mg IV every 6 months (1/2021-11/2024, unclear benefit), azathioprine 50>75>100 mg oral daily (ordered 12/26/23-present, unclear benefit), Tavneos 30 mg BID (ordered 1/16/25, unable to initiate due to insurance issues), mycophenolate 1000mg BID (ordered 4/16/25-present).     Pertinent positive labs: Undetectable B-cells CD19+/CD20+ expression at 99% viability (in 9/2024 at [facility]).     Pertinent negative labs: Negative SHAN, ANCA, anti-MPO, anti-PR3, C3, C4, and CH50 (in 3/2023), TPMT 28.9 (in 12/2023), CRP, ESR, eGFR, creatinine, LFTs, and acceptable CBC (in 9/2024 at [facility]). Negative CBC, CMP, ESR (4/2025 at [facility]). Negative collagen type II antibodies (in 6/2025)     Pertinent imaging: Xray right hip (1/2025) with minimal degenerative change.        INTERVAL HISTORY:  Reports interval history as noted on the questionnaire below or scanned under media tab.      [facility] Rheumatology Established Patient History Form     7/20/2025 12:22 PM PDT - Filed by Patient   MAIN REASON FOR VISIT follow up for GPA   INTERVAL HISTORY OF ILLNESS   Date of worsening onset: 30 days   Preceding incident/ailment: thick mucus in upper nasal   Describe/list your symptoms: plugged nasal airway   Exacerbating factors:     Alleviating factors: nasal rinse 2-3  times daily   Helpful medications: nasal rinse   Ineffective medications:     Severity of pain (scale of 1-10):     Personal/emotional stressors:     Luke All The Areas Of Pain     REVIEW OF SYMPTOMS     General   Fevers     Chills     Night sweats     Malaise     Fatigue Yes   Unintentional weight loss >20 lbs   Musculoskeletal   Joint pain Same with activity   Morning stiffness duration 30-60 mins   Morning stiffness characteristic Same with activity   Joint swelling Yes   Joint instability Yes   Tendon pain Yes   Muscle pain Yes   Body aches Yes   Dermatologic   Hair loss with bald spots     Hair shedding     Skin thickening     Skin plaques     Sunlight-induced skin rash     Cold-induced color changes (white, purple, red on rewarming)     Neurologic/Psychiatric   Weakness     Spasms     Tingling Yes   Burning Yes   Numbness Yes   Insomnia Yes   Anxiety     Depression     Head/Eyes   Headaches     Temple pain     Dizziness     Dry eyes     Eye pain     Eye redness     Blurry vision     Vision loss     Ears/Nose   Ear pain     Ringing in ears     Vertigo     Hearing loss     Nasal ulcers     Nosebleeds     Sinus pain     Nasal congestion     Snoring     Mouth/Throat   Oral ulcers     Bleeding gums     Dry mouth Yes   Cavities     Sore throat     Sticking in throat Yes   Difficulty speaking     Neck/Lymphatics   Thyroid pain     Thyroid swelling     Lymph node swelling     Cardiac/Respiratory   Chest pain with breathing     Dry cough     Cough with bloody phlegm     Shortness of breath     Fast heartbeats     Irregular heartbeats     Gastrointestinal   Nausea     Vomiting     Difficulty swallowing     Heartburn     Abdominal pain     Bloody stool     Mucus stool     Genitourinary   Pelvic pain     Genital ulcers     Abnormal discharge     Burning urination     Frothy urine     Blood in urine           REVIEW OF SYSTEMS:  Except as noted in the history above, relevant review of systems with emphasis on autoimmune rheumatic diseases was otherwise negative.        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 No pertinent past medical history.     PAST SURGICAL HISTORY:  [Past Surgical History]    [Past Surgical History]        Procedure Laterality Date    PROCEDURE LARGE JOINT INJECTION/ARTHROCENTESIS   1/22/2025    JERAMY BY LAPAROSCOPY N/A 4/16/2022     Procedure: CHOLECYSTECTOMY, LAPAROSCOPIC;  Surgeon: Demario Lal M.D.;  Location: SURGERY [facility];  Service: General        SOCIAL HISTORY:  Social History               Socioeconomic History    Marital status:        Spouse name: Not on file    Number of children: Not on file    Years of education: Not on file    Highest education level: 11th grade   Occupational History    Not on file   Tobacco Use    Smoking status: Some Days       Current packs/day: 0.50       Average packs/day: 0.5 packs/day for 30.0 years (15.0 ttl pk-yrs)       Types: Cigarettes    Smokeless tobacco: Never   Vaping Use    Vaping status: Never Used   Substance and Sexual Activity    Alcohol use: Not Currently       Comment: occ    Drug use: Never    Sexual activity: Not on file   Other Topics Concern    Not on file   Social History Narrative    Not on file      Social Drivers of Health           Financial Resource Strain: Low Risk  (9/29/2024)     Overall Financial Resource Strain (CARDIA)      Difficulty of Paying Living Expenses: Not hard at all   Food Insecurity: No Food Insecurity (9/29/2024)     Hunger Vital Sign      Worried About Running Out of Food in the Last Year: Never true      Ran Out of Food in the Last Year:  Never true   Transportation Needs: No Transportation Needs (9/29/2024)     PRAPARE - Transportation      Lack of Transportation (Medical): No      Lack of Transportation (Non-Medical): No   Physical Activity: Sufficiently Active (9/29/2024)     Exercise Vital Sign      Days of Exercise per Week: 4 days      Minutes of Exercise per Session: 120 min   Stress: No Stress Concern Present (9/29/2024)     [employer] [place] [employer] - Occupational Stress Questionnaire      Feeling of Stress : Not at all   Social Connections: Moderately Isolated (9/29/2024)     Social Connection and Isolation Panel [NHANES]      Frequency of Communication with Friends and Family: Twice a week      Frequency of Social Gatherings with Friends and Family: Twice a week      Attends Restoration Services: Never      Active Member of Clubs or Organizations: No      Attends Club or Organization Meetings: Never      Marital Status:    Intimate Partner Violence: Not on file   Housing Stability: Unknown (9/29/2024)     Housing Stability Vital Sign      Unable to Pay for Housing in the Last Year: No      Number of Times Moved in the Last Year: Not on file      Homeless in the Last Year: No            FAMILY HISTORY:  Family History   History reviewed. No pertinent family history.        MEDICATIONS:  Current Medications        Current Outpatient Medications   Medication Sig    Folic Acid 5 MG Cap Take 1 Capsule by mouth every day.    methylPREDNISolone (MEDROL DOSEPAK) 4 MG Tablet Therapy Pack As directed on the packaging label.    gabapentin (NEURONTIN) 300 MG Cap Take 1 capsule twice daily or take 2 capsules once nightly.    azaTHIOprine (IMURAN) 50 MG Tab Take 3 Tablets by mouth every day.    riTUXimab (RITUXAN) 100 MG/10ML Solution injection 500mg every 6 months    meloxicam (MOBIC) 15 MG tablet Take 15 mg by mouth every day.    QUEtiapine (SEROQUEL) 25 MG Tab TAKE (1) ONE Tablet BY MOUTH EVERY EVENING    topiramate (TOPAMAX) 25  "MG Tab TAKE (1) ONE Tablet BY MOUTH TWICE A DAY    atorvastatin (LIPITOR) 20 MG Tab Take 20 mg by mouth every day.    cyclobenzaprine (FLEXERIL) 10 mg Tab TAKE ONE TABLET BY MOUTH EVERY EVENING AT BEDTIME    amitriptyline (ELAVIL) 10 MG Tab TAKE 1 TO 3 TABLETS BY MOUTH EVERY EVENING AT BEDTIME AS NEEDED    mupirocin (BACTROBAN) 2 % Ointment Apply 1 Application. topically 2 times a day.    zolpidem (AMBIEN) 10 MG Tab Take 10 mg by mouth at bedtime.    promethazine (PHENERGAN) 25 MG Tab Take 25 mg by mouth 1 time a day as needed for Nausea/Vomiting. Pt takes with weekly methotrexate            ALLERGIES:   [Allergies]    [Allergies]        Allergen Reactions    Penicillins Vomiting       N/V, fever   Reaction ~15 years ago        IMMUNIZATIONS:       Immunization History   Administered Date(s) Administered    Covid-19 Mrna (Spikevax) Moderna 12+ Years 10/05/2024    MODERNA SARS-COV-2 VACCINE (12+) 02/03/2021, 03/03/2021, 10/29/2021    SARS-COV-2 (COVID-19) vaccine, UNSPECIFIED 10/20/2022                Objective[]Expand by Default  Vital Signs: /56 (BP Location: Left arm, Patient Position: Sitting, BP Cuff Size: Adult)   Pulse 78   Temp 36.2 °C (97.2 °F) (Temporal)   Resp 16   Ht 1.702 m (5' 7\")   Wt 71.3 kg (157 lb 3.2 oz)   SpO2 99% Body mass index is 24.62 kg/m².     General: Appears well and comfortable  Eyes: No scleral or conjunctival lesions  ENT: Dry pale nasal mucosae with thick crusted non-bloody mucus  Head/Neck: No apparent scalp or neck lesions  Cardiovascular: Regular rate and rhythm  Respiratory: Breathing quiet and unlabored  Gastrointestinal: No apparent organomegaly or abdominal masses  Integumentary: No significant cutaneous lesions or dyspigmentation  Musculoskeletal: No significant joint tenderness, swelling, warmth, erythema, or overt synovitis; no significant restriction in range of motion of joints examined  Neurologic: No focal sensory or motor deficits  Psychiatric: Mood and " affect appropriate        LABORATORY RESULTS REVIEWED AND INTERPRETED:           Lab Results   Component Value Date/Time     WBC 14.5 (H) 04/16/2022 08:25 AM     RBC 4.91 04/16/2022 08:25 AM     HEMOGLOBIN 15.7 04/16/2022 08:25 AM     HEMATOCRIT 46.9 04/16/2022 08:25 AM     MCV 95.5 04/16/2022 08:25 AM     MCH 32.0 04/16/2022 08:25 AM     MCHC 33.5 (L) 04/16/2022 08:25 AM     RDW 49.1 04/16/2022 08:25 AM     PLATELETCT 294 04/16/2022 08:25 AM     MPV 9.2 04/16/2022 08:25 AM     NEUTS 10.04 (H) 04/16/2022 08:25 AM     LYMPHOCYTES 20.90 (L) 04/16/2022 08:25 AM     MONOCYTES 7.10 04/16/2022 08:25 AM     EOSINOPHILS 1.80 04/16/2022 08:25 AM     BASOPHILS 0.30 04/16/2022 08:25 AM            Lab Results   Component Value Date/Time     ASTSGOT 16 04/16/2022 08:25 AM     ALTSGPT 24 04/16/2022 08:25 AM     ALKPHOSPHAT 112 (H) 04/16/2022 08:25 AM     TBILIRUBIN 0.4 04/16/2022 08:25 AM     TOTPROTEIN 7.2 04/16/2022 08:25 AM     ALBUMIN 4.3 04/16/2022 08:25 AM            Lab Results   Component Value Date/Time     SODIUM 141 04/16/2022 08:25 AM     POTASSIUM 4.5 04/16/2022 08:25 AM     CHLORIDE 105 04/16/2022 08:25 AM     CO2 23 04/16/2022 08:25 AM     GLUCOSE 111 (H) 04/16/2022 08:25 AM     BUN 17 04/16/2022 08:25 AM     CREATININE 0.57 04/16/2022 08:25 AM     CALCIUM 9.6 04/16/2022 08:25 AM            Lab Results   Component Value Date/Time     COLORURINE Yellow 04/16/2022 10:04 AM     SPECGRAVITY 1.023 04/16/2022 10:04 AM     PHURINE 5.5 04/16/2022 10:04 AM     GLUCOSEUR Negative 04/16/2022 10:04 AM     KETONES Negative 04/16/2022 10:04 AM     PROTEINURIN Negative 04/16/2022 10:04 AM      RADIOLOGY RESULTS REVIEWED AND INTERPRETED:  No relevant loadable results found in the system. Pertinent non-system results, if applicable, summarized under history above.         All relevant laboratory and imaging results reported on this note were reviewed and interpreted by me.            Assessment & Plan  Celine Li is a 60  y.o. female with history and physical as noted above whose presentation merits the following clinical impressions and recommendations:     1. 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 However, still early to  effectiveness of treatment after azathioprine dose recently increased. Also, it would reasonable to re-check ANCA profile given negative collagen type II antibodies.  - ANCA-ASSOCIATED VASCULITIS PROFILE (ANCA/MPO/PR3); Future  - COMPLEMENT C3; Future  - COMPLEMENT C4; Future  - COMPLEMENT TOTAL (CH50); Future  - methylPREDNISolone (MEDROL DOSEPAK) 4 MG Tablet Therapy Pack; As directed on the packaging label.  Dispense: 21 Tablet; Refill: 0  - Continue azathioprine 150 mg daily     2. Chronic pansinusitis  Chronic active problem that is the predominant manifestation of her GPA which sometimes has superimposed infection that is managed with long-term topical and oral antimicrobial agents. Given her immunosuppressed status, this raises some concern for microbial drug resistance for which she previously completed treatment with cefepime and meropenem.  - Continue moisturizing nasal rinses as needed  - Continue mupirocin 2% ointment twice daily as needed  - Routine follow-up with ENT     3. Peripheral sensory neuropathy of hands and feet  Clinically appears to be improved on the current dose of gabapentin, so need for treatment escalation. Would note that given GPA can cause mononeuritis multiplex, this is an important differential to keep in consideration.  - Continue gabapentin 300 mg 2 times daily or 600 mg nightly     4. Primary osteoarthritis of both hands  Significant etiology of her biomechanical arthralgia which can be managed supportively.  - Paraffin wax hand therapy PRN as previously recommended  - Tylenol Arthritis and Voltaren 1% gel with or without oral NSAIDs as needed  - Consider  intra-articular steroid injection if that becomes necessary     5. Macrocytic anemia  Presumably secondary to azathioprine given mild erythropenia and macrocytosis, so would be reasonable to increase dose of folic acid and check vitamin B12 as a differential cause of macrocytic anemia  - Folic Acid 5 MG Cap; Take 1 Capsule by mouth every day.  Dispense: 90 Capsule; Refill: 3  - FOLATE; Future  - VITAMIN B12; Future  - CBC (previously ordered, so reprinted)     6. Immunosuppressed status (HCC)  High risk immunosuppressant use requiring routine monitoring labs prior to every visit to assess for possible side effects including but not limited to myelotoxicity, hepatotoxicity, and opportunistic infections.  - CBC and CMP (previously ordered, so reprinted)  - Need to ascertain age-appropriate vaccines in addition to the ones listed above under immunizations        The above assessment and plan were discussed with the patient who acknowledged understanding of the plan.     FOLLOW-UP: Return in about 4 months (around 11/23/2025) for Short.            Thank you for the opportunity to participate in the care of Celine Li.     Betina Ayala M.D.   Internal Medicine Resident, PGY-3        ATTENDING ATTESTATION:  I personally saw and examined this patient, supervised and discussed the case with the resident who participated in the care of the patient. I have carefully reviewed the note in its entirety, made modifications as deemed appropriate, and agree with its content. I hereby attest that the documentation accurately reflects the history, physical exam, assessment and plan of care for the patient.     Arturo Marc MD, MS, FACR  Rheumatologist, [facility]ology, [facility]   of Clinical Medicine, Department of Internal Medicine  [facility]

## 2025-07-25 LAB — CH50 SERPL-ACNC: 94.8 U/ML (ref 38.7–89.9)

## 2025-07-26 LAB
ANCA IFA PATTERN Q6048: NORMAL
ANCA IFA TITER Q6047: NORMAL
MYELOPEROXIDASE AB SER-ACNC: 0 AU/ML (ref 0–19)
PROTEINASE3 AB SER-ACNC: 0 AU/ML (ref 0–19)

## (undated) DEVICE — MASK ANESTHESIA ADULT  - (100/CA)

## (undated) DEVICE — SCISSORS 5MM CVD (6EA/BX)

## (undated) DEVICE — SET EXTENSION WITH 2 PORTS (48EA/CA) ***PART #2C8610 IS A SUBSTITUTE*****

## (undated) DEVICE — CANNULA W/SEAL 5X100 Z-THRE - ADED KII (12/BX)

## (undated) DEVICE — SUTURE 0 VICRYL PLUS UR-6 - 27 INCH (36/BX)

## (undated) DEVICE — CLIP MED LG INTNL HRZN TI ESCP - (20/BX)

## (undated) DEVICE — DERMABOND ADVANCED - (12EA/BX)

## (undated) DEVICE — SUCTION INSTRUMENT YANKAUER BULBOUS TIP W/O VENT (50EA/CA)

## (undated) DEVICE — SET TUBING PNEUMOCLEAR HIGH FLOW SMOKE EVACUATION (10EA/BX)

## (undated) DEVICE — SYRINGE 30 ML LL (56/BX)

## (undated) DEVICE — HEAD HOLDER JUNIOR/ADULT

## (undated) DEVICE — TROCAR Z THREAD12MM OPTICAL - NON BLADED (6/BX)

## (undated) DEVICE — SENSOR SPO2 NEO LNCS ADHESIVE (20/BX) SEE USER NOTES

## (undated) DEVICE — SET LEADWIRE 5 LEAD BEDSIDE DISPOSABLE ECG (1SET OF 5/EA)

## (undated) DEVICE — SODIUM CHL IRRIGATION 0.9% 1000ML (12EA/CA)

## (undated) DEVICE — PACK LAP CHOLE OR - (2EA/CA)

## (undated) DEVICE — ELECTRODE 850 FOAM ADHESIVE - HYDROGEL RADIOTRNSPRNT (50/PK)

## (undated) DEVICE — PROTECTOR ULNA NERVE - (36PR/CA)

## (undated) DEVICE — TUBE CONNECT SUCTION CLEAR 120 X 1/4" (50EA/CA)"

## (undated) DEVICE — NEPTUNE 4 PORT MANIFOLD - (20/PK)

## (undated) DEVICE — KIT ANESTHESIA W/CIRCUIT & 3/LT BAG W/FILTER (20EA/CA)

## (undated) DEVICE — TOWEL STOP TIMEOUT SAFETY FLAG (40EA/CA)

## (undated) DEVICE — GLOVE BIOGEL SZ 7.5 SURGICAL PF LTX - (50PR/BX 4BX/CA)

## (undated) DEVICE — CHLORAPREP 26 ML APPLICATOR - ORANGE TINT(25/CA)

## (undated) DEVICE — TUBING CLEARLINK DUO-VENT - C-FLO (48EA/CA)

## (undated) DEVICE — SLEEVE, VASO, THIGH, MED

## (undated) DEVICE — CANISTER SUCTION 3000ML MECHANICAL FILTER AUTO SHUTOFF MEDI-VAC NONSTERILE LF DISP  (40EA/CA)

## (undated) DEVICE — SUTURE GENERAL

## (undated) DEVICE — TROCAR 5X100 NON BLADED Z-TH - READ KII (6/BX)

## (undated) DEVICE — BAG RETRIEVAL 10ML (10EA/BX)

## (undated) DEVICE — SUTURE 4-0 MONOCRYL PLUS PS-2 - 27 INCH (36/BX)

## (undated) DEVICE — ELECTRODE DUAL RETURN W/ CORD - (50/PK)

## (undated) DEVICE — LACTATED RINGERS INJ 1000 ML - (14EA/CA 60CA/PF)

## (undated) DEVICE — GOWN WARMING STANDARD FLEX - (30/CA)

## (undated) DEVICE — NEEDLE INSFL 120MM 14GA VRRS - (20/BX)